# Patient Record
Sex: FEMALE | Race: WHITE | NOT HISPANIC OR LATINO | RURAL
[De-identification: names, ages, dates, MRNs, and addresses within clinical notes are randomized per-mention and may not be internally consistent; named-entity substitution may affect disease eponyms.]

---

## 2022-01-01 ENCOUNTER — APPOINTMENT (OUTPATIENT)
Dept: RADIOLOGY | Facility: HOSPITAL | Age: 82
End: 2022-01-01
Payer: MEDICARE

## 2022-01-01 ENCOUNTER — HOSPITAL ENCOUNTER (INPATIENT)
Facility: HOSPITAL | Age: 82
LOS: 6 days | DRG: 193 | End: 2022-12-15
Attending: FAMILY MEDICINE | Admitting: FAMILY MEDICINE
Payer: MEDICARE

## 2022-01-01 ENCOUNTER — HOSPITAL ENCOUNTER (OUTPATIENT)
Dept: RADIOLOGY | Facility: HOSPITAL | Age: 82
Discharge: HOME | End: 2022-12-14
Payer: MEDICARE

## 2022-01-01 VITALS — OXYGEN SATURATION: 97 % | HEART RATE: 59 BPM

## 2022-01-01 VITALS
BODY MASS INDEX: 16.26 KG/M2 | OXYGEN SATURATION: 86 % | HEART RATE: 35 BPM | HEIGHT: 61 IN | WEIGHT: 86.13 LBS | RESPIRATION RATE: 12 BRPM | TEMPERATURE: 97 F | SYSTOLIC BLOOD PRESSURE: 49 MMHG | DIASTOLIC BLOOD PRESSURE: 20 MMHG

## 2022-01-01 DIAGNOSIS — R63.8 UNABLE TO EAT: ICD-10-CM

## 2022-01-01 DIAGNOSIS — D64.9 CHRONIC ANEMIA: ICD-10-CM

## 2022-01-01 DIAGNOSIS — J18.9 PNEUMONIA: ICD-10-CM

## 2022-01-01 DIAGNOSIS — J18.9 PNEUMONIA INVOLVING LEFT LUNG: Primary | ICD-10-CM

## 2022-01-01 DIAGNOSIS — E43 SEVERE MALNUTRITION: ICD-10-CM

## 2022-01-01 DIAGNOSIS — R07.9 CHEST PAIN: ICD-10-CM

## 2022-01-01 DIAGNOSIS — J44.9 COPD (CHRONIC OBSTRUCTIVE PULMONARY DISEASE): ICD-10-CM

## 2022-01-01 DIAGNOSIS — J18.9 PNEUMONIA OF LEFT LOWER LOBE DUE TO INFECTIOUS ORGANISM: ICD-10-CM

## 2022-01-01 DIAGNOSIS — J44.1 COPD EXACERBATION: ICD-10-CM

## 2022-01-01 LAB
25(OH)D3 SERPL-MCNC: 12.5 NG/ML
ABO + RH BLD: NORMAL
ABORH RETYPE: NORMAL
ACANTHOCYTES BLD QL SMEAR: ABNORMAL
ALBUMIN FLD-MCNC: 0.7 G/DL
ALBUMIN SERPL BCP-MCNC: 1.8 G/DL (ref 3.5–5)
ALBUMIN SERPL BCP-MCNC: 1.8 G/DL (ref 3.5–5)
ALBUMIN SERPL BCP-MCNC: 2.9 G/DL (ref 3.5–5)
ALBUMIN/GLOB SERPL: 0.7 {RATIO}
ALBUMIN/GLOB SERPL: 0.7 {RATIO}
ALBUMIN/GLOB SERPL: 1.3 {RATIO}
ALP SERPL-CCNC: 54 U/L (ref 55–142)
ALP SERPL-CCNC: 71 U/L (ref 55–142)
ALP SERPL-CCNC: 74 U/L (ref 55–142)
ALT SERPL W P-5'-P-CCNC: 14 U/L (ref 13–56)
ALT SERPL W P-5'-P-CCNC: 14 U/L (ref 13–56)
ALT SERPL W P-5'-P-CCNC: 15 U/L (ref 13–56)
AMMONIA PLAS-SCNC: 31 ΜMOL/L (ref 11–32)
ANION GAP SERPL CALCULATED.3IONS-SCNC: -28 MMOL/L (ref 7–16)
ANION GAP SERPL CALCULATED.3IONS-SCNC: 14 MMOL/L (ref 7–16)
ANION GAP SERPL CALCULATED.3IONS-SCNC: 16 MMOL/L (ref 7–16)
ANION GAP SERPL CALCULATED.3IONS-SCNC: 3 MMOL/L (ref 7–16)
ANION GAP SERPL CALCULATED.3IONS-SCNC: 4 MMOL/L (ref 7–16)
ANION GAP SERPL CALCULATED.3IONS-SCNC: 7 MMOL/L (ref 7–16)
ANION GAP SERPL CALCULATED.3IONS-SCNC: 8 MMOL/L (ref 7–16)
ANISOCYTOSIS BLD QL SMEAR: ABNORMAL
AORTIC ROOT ANNULUS: 2.5 CM
AORTIC VALVE CUSP SEPERATION: 1.88 CM
APTT PPP: 37.1 SECONDS (ref 25.2–37.3)
AST SERPL W P-5'-P-CCNC: 20 U/L (ref 15–37)
AST SERPL W P-5'-P-CCNC: 22 U/L (ref 15–37)
AST SERPL W P-5'-P-CCNC: 27 U/L (ref 15–37)
AV INDEX (PROSTH): 0.29
AV MEAN GRADIENT: 10 MMHG
AV PEAK GRADIENT: 27 MMHG
AV REGURGITATION PRESSURE HALF TIME: 1051 MS
AV VALVE AREA: 0.66 CM2
AV VELOCITY RATIO: 0.31
BACTERIA SPEC BFLD CULT: NORMAL
BASOPHILS # BLD AUTO: 0 K/UL (ref 0–0.2)
BASOPHILS # BLD AUTO: 0.01 K/UL (ref 0–0.2)
BASOPHILS # BLD AUTO: 0.01 K/UL (ref 0–0.2)
BASOPHILS # BLD AUTO: 0.02 K/UL (ref 0–0.2)
BASOPHILS # BLD AUTO: 0.02 K/UL (ref 0–0.2)
BASOPHILS # BLD AUTO: 0.04 K/UL (ref 0–0.2)
BASOPHILS NFR BLD AUTO: 0 % (ref 0–1)
BASOPHILS NFR BLD AUTO: 0.1 % (ref 0–1)
BASOPHILS NFR BLD AUTO: 0.3 % (ref 0–1)
BILIRUB SERPL-MCNC: 0.4 MG/DL (ref ?–1.2)
BILIRUB SERPL-MCNC: 0.5 MG/DL (ref ?–1.2)
BILIRUB SERPL-MCNC: 0.7 MG/DL (ref ?–1.2)
BILIRUB UR QL STRIP: NEGATIVE
BLD PROD TYP BPU: NORMAL
BLOOD UNIT EXPIRATION DATE: NORMAL
BLOOD UNIT TYPE CODE: 5100
BUN SERPL-MCNC: 16 MG/DL (ref 7–18)
BUN SERPL-MCNC: 28 MG/DL (ref 7–18)
BUN SERPL-MCNC: 29 MG/DL (ref 7–18)
BUN SERPL-MCNC: 34 MG/DL (ref 7–18)
BUN SERPL-MCNC: 36 MG/DL (ref 7–18)
BUN SERPL-MCNC: 36 MG/DL (ref 7–18)
BUN SERPL-MCNC: 45 MG/DL (ref 7–18)
BUN/CREAT SERPL: 24 (ref 6–20)
BUN/CREAT SERPL: 28 (ref 6–20)
BUN/CREAT SERPL: 30 (ref 6–20)
BUN/CREAT SERPL: 30 (ref 6–20)
BUN/CREAT SERPL: 33 (ref 6–20)
BUN/CREAT SERPL: 33 (ref 6–20)
BUN/CREAT SERPL: 35 (ref 6–20)
CALCIUM SERPL-MCNC: 7.4 MG/DL (ref 8.5–10.1)
CALCIUM SERPL-MCNC: 7.6 MG/DL (ref 8.5–10.1)
CALCIUM SERPL-MCNC: 7.6 MG/DL (ref 8.5–10.1)
CALCIUM SERPL-MCNC: 7.9 MG/DL (ref 8.5–10.1)
CHLORIDE SERPL-SCNC: 106 MMOL/L (ref 98–107)
CHLORIDE SERPL-SCNC: 107 MMOL/L (ref 98–107)
CHLORIDE SERPL-SCNC: 109 MMOL/L (ref 98–107)
CHLORIDE SERPL-SCNC: 109 MMOL/L (ref 98–107)
CHLORIDE SERPL-SCNC: 110 MMOL/L (ref 98–107)
CLARITY FLD: CLEAR
CLARITY UR: CLEAR
CO2 SERPL-SCNC: 21 MMOL/L (ref 21–32)
CO2 SERPL-SCNC: 21 MMOL/L (ref 21–32)
CO2 SERPL-SCNC: 22 MMOL/L (ref 21–32)
CO2 SERPL-SCNC: 23 MMOL/L (ref 21–32)
CO2 SERPL-SCNC: 26 MMOL/L (ref 21–32)
CO2 SERPL-SCNC: 29 MMOL/L (ref 21–32)
CO2 SERPL-SCNC: 33 MMOL/L (ref 21–32)
COLOR FLD: ABNORMAL
COLOR UR: NORMAL
CREAT SERPL-MCNC: 0.66 MG/DL (ref 0.55–1.02)
CREAT SERPL-MCNC: 0.88 MG/DL (ref 0.55–1.02)
CREAT SERPL-MCNC: 0.93 MG/DL (ref 0.55–1.02)
CREAT SERPL-MCNC: 1.04 MG/DL (ref 0.55–1.02)
CREAT SERPL-MCNC: 1.08 MG/DL (ref 0.55–1.02)
CREAT SERPL-MCNC: 1.12 MG/DL (ref 0.55–1.02)
CREAT SERPL-MCNC: 1.59 MG/DL (ref 0.55–1.02)
CRENATED CELLS: ABNORMAL
CRENATED CELLS: ABNORMAL
CROSSMATCH INTERPRETATION: NORMAL
CV ECHO LV RWT: 0.77 CM
D DIMER PPP FEU-MCNC: 2.43 ΜG/ML (ref 0–0.47)
DIFFERENTIAL METHOD BLD: ABNORMAL
DISPENSE STATUS: NORMAL
DOP CALC AO PEAK VEL: 2.6 M/S
DOP CALC AO VTI: 65 CM
DOP CALC LVOT AREA: 2.3 CM2
DOP CALC LVOT DIAMETER: 1.7 CM
DOP CALC LVOT PEAK VEL: 0.8 M/S
DOP CALC LVOT STROKE VOLUME: 43.1 CM3
DOP CALC MV VTI: 27.2 CM
DOP CALCLVOT PEAK VEL VTI: 19 CM
E WAVE DECELERATION TIME: 171 MSEC
ECHO EF ESTIMATED: 55 %
ECHO LV POSTERIOR WALL: 1.58 CM (ref 0.6–1.1)
EGFR (NO RACE VARIABLE) (RUSH/TITUS): 33 ML/MIN/1.73M²
EGFR (NO RACE VARIABLE) (RUSH/TITUS): 50 ML/MIN/1.73M²
EGFR (NO RACE VARIABLE) (RUSH/TITUS): 52 ML/MIN/1.73M²
EGFR (NO RACE VARIABLE) (RUSH/TITUS): 54 ML/MIN/1.73M²
EGFR (NO RACE VARIABLE) (RUSH/TITUS): 62 ML/MIN/1.73M²
EGFR (NO RACE VARIABLE) (RUSH/TITUS): 66 ML/MIN/1.73M²
EGFR (NO RACE VARIABLE) (RUSH/TITUS): 88 ML/MIN/1.73M²
EJECTION FRACTION: 55 %
EOSINOPHIL # BLD AUTO: 0 K/UL (ref 0–0.5)
EOSINOPHIL NFR BLD AUTO: 0 % (ref 1–4)
ERYTHROCYTE [DISTWIDTH] IN BLOOD BY AUTOMATED COUNT: 16 % (ref 11.5–14.5)
ERYTHROCYTE [DISTWIDTH] IN BLOOD BY AUTOMATED COUNT: 16.4 % (ref 11.5–14.5)
ERYTHROCYTE [DISTWIDTH] IN BLOOD BY AUTOMATED COUNT: 16.6 % (ref 11.5–14.5)
ERYTHROCYTE [DISTWIDTH] IN BLOOD BY AUTOMATED COUNT: 16.7 % (ref 11.5–14.5)
ERYTHROCYTE [DISTWIDTH] IN BLOOD BY AUTOMATED COUNT: 17.1 % (ref 11.5–14.5)
ERYTHROCYTE [DISTWIDTH] IN BLOOD BY AUTOMATED COUNT: 18 % (ref 11.5–14.5)
FERRITIN SERPL-MCNC: 724 NG/ML (ref 8–252)
FERRITIN SERPL-MCNC: 975 NG/ML (ref 8–252)
FLUAV AG UPPER RESP QL IA.RAPID: NEGATIVE
FLUBV AG UPPER RESP QL IA.RAPID: NEGATIVE
FRACTIONAL SHORTENING: 15 % (ref 28–44)
GLOBULIN SER-MCNC: 2.3 G/DL (ref 2–4)
GLOBULIN SER-MCNC: 2.7 G/DL (ref 2–4)
GLOBULIN SER-MCNC: 2.7 G/DL (ref 2–4)
GLUCOSE FLD-MCNC: 124 MG/DL
GLUCOSE SERPL-MCNC: 102 MG/DL (ref 74–106)
GLUCOSE SERPL-MCNC: 109 MG/DL (ref 74–106)
GLUCOSE SERPL-MCNC: 131 MG/DL (ref 74–106)
GLUCOSE SERPL-MCNC: 73 MG/DL (ref 74–106)
GLUCOSE SERPL-MCNC: 81 MG/DL (ref 74–106)
GLUCOSE SERPL-MCNC: 96 MG/DL (ref 74–106)
GLUCOSE SERPL-MCNC: 98 MG/DL (ref 74–106)
GLUCOSE UR STRIP-MCNC: NORMAL MG/DL
GRANULOCYTES NFR FLD MANUAL: 70 % (ref 0–25)
HCO3 UR-SCNC: 20.2 MMOL/L (ref 21–28)
HCO3 UR-SCNC: ABNORMAL MMOL/L (ref 21–28)
HCT VFR BLD AUTO: 21.8 % (ref 38–47)
HCT VFR BLD AUTO: 26.5 % (ref 38–47)
HCT VFR BLD AUTO: 27.1 % (ref 38–47)
HCT VFR BLD AUTO: 27.4 % (ref 38–47)
HCT VFR BLD AUTO: 28.6 % (ref 38–47)
HCT VFR BLD AUTO: 31.4 % (ref 38–47)
HGB BLD-MCNC: 10.2 G/DL (ref 12–16)
HGB BLD-MCNC: 6.9 G/DL (ref 12–16)
HGB BLD-MCNC: 8 G/DL (ref 12–16)
HGB BLD-MCNC: 8.3 G/DL (ref 12–16)
HGB BLD-MCNC: 8.5 G/DL (ref 12–16)
HGB BLD-MCNC: 9 G/DL (ref 12–16)
HYPOCHROMIA BLD QL SMEAR: ABNORMAL
IMM GRANULOCYTES # BLD AUTO: 0.05 K/UL (ref 0–0.04)
IMM GRANULOCYTES # BLD AUTO: 0.06 K/UL (ref 0–0.04)
IMM GRANULOCYTES # BLD AUTO: 0.11 K/UL (ref 0–0.04)
IMM GRANULOCYTES # BLD AUTO: 0.11 K/UL (ref 0–0.04)
IMM GRANULOCYTES # BLD AUTO: 0.16 K/UL (ref 0–0.04)
IMM GRANULOCYTES # BLD AUTO: 0.19 K/UL (ref 0–0.04)
IMM GRANULOCYTES NFR BLD: 0.5 % (ref 0–0.4)
IMM GRANULOCYTES NFR BLD: 0.8 % (ref 0–0.4)
IMM GRANULOCYTES NFR BLD: 0.9 % (ref 0–0.4)
IMM GRANULOCYTES NFR BLD: 1 % (ref 0–0.4)
IMM GRANULOCYTES NFR BLD: 1.3 % (ref 0–0.4)
IMM GRANULOCYTES NFR BLD: 1.3 % (ref 0–0.4)
INDIRECT COOMBS: NORMAL
INR BLD: 1.37
INSULIN SERPL-ACNC: NORMAL U[IU]/ML
INTERVENTRICULAR SEPTUM: 1.7 CM (ref 0.6–1.1)
IRON SATN MFR SERPL: 32 % (ref 14–50)
IRON SATN MFR SERPL: 81 % (ref 14–50)
IRON SERPL-MCNC: 37 ΜG/DL (ref 50–170)
IRON SERPL-MCNC: 81 ΜG/DL (ref 50–170)
IVC OSTIUM: 1.1 CM
KETONES UR STRIP-SCNC: NEGATIVE MG/DL
LAB AP GROSS DESCRIPTION: NORMAL
LAB AP LABORATORY NOTES: NORMAL
LAB AP SPECIMEN A NON-GYN GENERAL CATEGORIZATION: NEGATIVE
LAB AP SPECIMEN A NON-GYN INTERPETATION: NORMAL
LDH FLD-CCNC: 75 U/L
LDH SERPL-CCNC: 232 U/L (ref 87–241)
LEFT ATRIUM SIZE: 3.7 CM
LEFT INTERNAL DIMENSION IN SYSTOLE: 3.5 CM (ref 2.1–4)
LEFT VENTRICLE DIASTOLIC VOLUME INDEX: 56.21 ML/M2
LEFT VENTRICLE DIASTOLIC VOLUME: 74.2 ML
LEFT VENTRICLE MASS INDEX: 210 G/M2
LEFT VENTRICLE SYSTOLIC VOLUME INDEX: 38.6 ML/M2
LEFT VENTRICLE SYSTOLIC VOLUME: 50.9 ML
LEFT VENTRICULAR INTERNAL DIMENSION IN DIASTOLE: 4.1 CM (ref 3.5–6)
LEFT VENTRICULAR MASS: 277.68 G
LEUKOCYTE ESTERASE UR QL STRIP: NEGATIVE
LVOT MG: 2 MMHG
LYMPHOCYTES # BLD AUTO: 0.35 K/UL (ref 1–4.8)
LYMPHOCYTES # BLD AUTO: 0.45 K/UL (ref 1–4.8)
LYMPHOCYTES # BLD AUTO: 0.5 K/UL (ref 1–4.8)
LYMPHOCYTES # BLD AUTO: 0.61 K/UL (ref 1–4.8)
LYMPHOCYTES # BLD AUTO: 0.69 K/UL (ref 1–4.8)
LYMPHOCYTES # BLD AUTO: 0.84 K/UL (ref 1–4.8)
LYMPHOCYTES NFR BLD AUTO: 3.3 % (ref 27–41)
LYMPHOCYTES NFR BLD AUTO: 4.6 % (ref 27–41)
LYMPHOCYTES NFR BLD AUTO: 4.7 % (ref 27–41)
LYMPHOCYTES NFR BLD AUTO: 5.4 % (ref 27–41)
LYMPHOCYTES NFR BLD AUTO: 6 % (ref 27–41)
LYMPHOCYTES NFR BLD AUTO: 6.1 % (ref 27–41)
LYMPHOCYTES NFR BLD MANUAL: 3 % (ref 27–41)
LYMPHOCYTES NFR BLD MANUAL: 4 % (ref 27–41)
LYMPHOCYTES NFR BLD MANUAL: 5 % (ref 27–41)
LYMPHOCYTES NFR BLD MANUAL: 9 % (ref 27–41)
LYMPHOCYTES NFR FLD MANUAL: 4 %
MACROCYTES BLD QL SMEAR: ABNORMAL
MACROPHAGES NFR FLD MANUAL: 12 %
MAGNESIUM SERPL-MCNC: 1.7 MG/DL (ref 1.7–2.3)
MAGNESIUM SERPL-MCNC: 1.8 MG/DL (ref 1.7–2.3)
MCH RBC QN AUTO: 30 PG (ref 27–31)
MCH RBC QN AUTO: 30.2 PG (ref 27–31)
MCH RBC QN AUTO: 30.3 PG (ref 27–31)
MCH RBC QN AUTO: 30.4 PG (ref 27–31)
MCH RBC QN AUTO: 30.5 PG (ref 27–31)
MCH RBC QN AUTO: 30.5 PG (ref 27–31)
MCHC RBC AUTO-ENTMCNC: 29.2 G/DL (ref 32–36)
MCHC RBC AUTO-ENTMCNC: 31.3 G/DL (ref 32–36)
MCHC RBC AUTO-ENTMCNC: 31.4 G/DL (ref 32–36)
MCHC RBC AUTO-ENTMCNC: 31.5 G/DL (ref 32–36)
MCHC RBC AUTO-ENTMCNC: 31.7 G/DL (ref 32–36)
MCHC RBC AUTO-ENTMCNC: 32.5 G/DL (ref 32–36)
MCV RBC AUTO: 104.6 FL (ref 80–96)
MCV RBC AUTO: 94 FL (ref 80–96)
MCV RBC AUTO: 95.6 FL (ref 80–96)
MCV RBC AUTO: 95.8 FL (ref 80–96)
MCV RBC AUTO: 96.4 FL (ref 80–96)
MCV RBC AUTO: 96.6 FL (ref 80–96)
MONOCYTES # BLD AUTO: 0.09 K/UL (ref 0–0.8)
MONOCYTES # BLD AUTO: 0.12 K/UL (ref 0–0.8)
MONOCYTES # BLD AUTO: 0.22 K/UL (ref 0–0.8)
MONOCYTES # BLD AUTO: 0.23 K/UL (ref 0–0.8)
MONOCYTES # BLD AUTO: 0.25 K/UL (ref 0–0.8)
MONOCYTES # BLD AUTO: 0.26 K/UL (ref 0–0.8)
MONOCYTES NFR BLD AUTO: 1.1 % (ref 2–6)
MONOCYTES NFR BLD AUTO: 1.2 % (ref 2–6)
MONOCYTES NFR BLD AUTO: 1.5 % (ref 2–6)
MONOCYTES NFR BLD AUTO: 1.7 % (ref 2–6)
MONOCYTES NFR BLD AUTO: 1.9 % (ref 2–6)
MONOCYTES NFR BLD AUTO: 2.8 % (ref 2–6)
MONOCYTES NFR BLD MANUAL: 1 % (ref 2–6)
MONOCYTES NFR BLD MANUAL: 1 % (ref 2–6)
MONOCYTES NFR BLD MANUAL: 3 % (ref 2–6)
MONOCYTES NFR BLD MANUAL: 3 % (ref 2–6)
MONOCYTES NFR FLD MANUAL: 14 %
MPC BLD CALC-MCNC: 10.2 FL (ref 9.4–12.4)
MPC BLD CALC-MCNC: 10.3 FL (ref 9.4–12.4)
MPC BLD CALC-MCNC: 10.4 FL (ref 9.4–12.4)
MPC BLD CALC-MCNC: 9.3 FL (ref 9.4–12.4)
MPC BLD CALC-MCNC: 9.8 FL (ref 9.4–12.4)
MPC BLD CALC-MCNC: 9.8 FL (ref 9.4–12.4)
MV MEAN GRADIENT: 1 MMHG
MV PEAK E VEL: 0.67 M/S
MV PEAK GRADIENT: 4 MMHG
MV STENOSIS PRESSURE HALF TIME: 89 MS
MV VALVE AREA BY CONTINUITY EQUATION: 1.58 CM2
MV VALVE AREA P 1/2 METHOD: 2.47 CM2
NEUTROPHILS # BLD AUTO: 10.15 K/UL (ref 1.8–7.7)
NEUTROPHILS # BLD AUTO: 11.92 K/UL (ref 1.8–7.7)
NEUTROPHILS # BLD AUTO: 13.79 K/UL (ref 1.8–7.7)
NEUTROPHILS # BLD AUTO: 14.39 K/UL (ref 1.8–7.7)
NEUTROPHILS # BLD AUTO: 6.85 K/UL (ref 1.8–7.7)
NEUTROPHILS # BLD AUTO: 7.37 K/UL (ref 1.8–7.7)
NEUTROPHILS NFR BLD AUTO: 89.7 % (ref 53–65)
NEUTROPHILS NFR BLD AUTO: 91.8 % (ref 53–65)
NEUTROPHILS NFR BLD AUTO: 92 % (ref 53–65)
NEUTROPHILS NFR BLD AUTO: 92.2 % (ref 53–65)
NEUTROPHILS NFR BLD AUTO: 92.5 % (ref 53–65)
NEUTROPHILS NFR BLD AUTO: 95 % (ref 53–65)
NEUTS BAND NFR BLD MANUAL: 13 % (ref 1–5)
NEUTS BAND NFR BLD MANUAL: 5 % (ref 1–5)
NEUTS BAND NFR BLD MANUAL: 5 % (ref 1–5)
NEUTS BAND NFR BLD MANUAL: 9 % (ref 1–5)
NEUTS SEG NFR BLD MANUAL: 79 % (ref 50–62)
NEUTS SEG NFR BLD MANUAL: 85 % (ref 50–62)
NEUTS SEG NFR BLD MANUAL: 89 % (ref 50–62)
NEUTS SEG NFR BLD MANUAL: 90 % (ref 50–62)
NEUTS SEG NFR BLD MANUAL: 91 % (ref 50–62)
NEUTS SEG NFR BLD MANUAL: 95 % (ref 50–62)
NITRITE UR QL STRIP: NEGATIVE
NRBC # BLD AUTO: 0 X10E3/UL
NRBC # BLD AUTO: 0.03 X10E3/UL
NRBC, AUTO (.00): 0 %
NRBC, AUTO (.00): 0.4 %
OVALOCYTES BLD QL SMEAR: ABNORMAL
PCO2 BLDA: 29 MMHG (ref 35–48)
PCO2 BLDA: >115 MMHG (ref 35–48)
PH FLD STRIP: 8 PH UNITS (ref 6.8–7.6)
PH SMN: 6.98 [PH] (ref 7.35–7.45)
PH SMN: 7.45 [PH] (ref 7.35–7.45)
PH UR STRIP: 5.5 PH UNITS
PHOSPHATE SERPL-MCNC: 4.3 MG/DL (ref 2.5–4.5)
PISA AR MAX VEL: 3.54 M/S
PISA TR MAX VEL: 2.7 M/S
PLATELET # BLD AUTO: 113 K/UL (ref 150–400)
PLATELET # BLD AUTO: 122 K/UL (ref 150–400)
PLATELET # BLD AUTO: 143 K/UL (ref 150–400)
PLATELET # BLD AUTO: 150 K/UL (ref 150–400)
PLATELET # BLD AUTO: 165 K/UL (ref 150–400)
PLATELET # BLD AUTO: 184 K/UL (ref 150–400)
PLATELET MORPHOLOGY: ABNORMAL
PLATELET MORPHOLOGY: NORMAL
PO2 BLDA: 70 MMHG (ref 83–108)
PO2 BLDA: 81 MMHG (ref 83–108)
POC BASE EXCESS: -2.8 MMOL/L (ref -2–3)
POC BASE EXCESS: ABNORMAL MMOL/L (ref -2–3)
POC SATURATED O2: 95 %
POC SATURATED O2: ABNORMAL %
POTASSIUM SERPL-SCNC: 2.1 MMOL/L (ref 3.5–5.1)
POTASSIUM SERPL-SCNC: 2.2 MMOL/L (ref 3.5–5.1)
POTASSIUM SERPL-SCNC: 2.4 MMOL/L (ref 3.5–5.1)
POTASSIUM SERPL-SCNC: 3 MMOL/L (ref 3.5–5.1)
POTASSIUM SERPL-SCNC: 3.2 MMOL/L (ref 3.5–5.1)
POTASSIUM SERPL-SCNC: 3.5 MMOL/L (ref 3.5–5.1)
POTASSIUM SERPL-SCNC: 3.5 MMOL/L (ref 3.5–5.1)
PREALB SERPL NEPH-MCNC: 12 MG/DL (ref 20–40)
PROT FLD-MCNC: 1.3 G/DL
PROT SERPL-MCNC: 4.5 G/DL (ref 6.4–8.2)
PROT SERPL-MCNC: 4.5 G/DL (ref 6.4–8.2)
PROT SERPL-MCNC: 5.2 G/DL (ref 6.4–8.2)
PROT UR QL STRIP: NEGATIVE
PROTHROMBIN TIME: 16.3 SECONDS (ref 11.7–14.7)
RA MAJOR: 2.9 CM
RA PRESSURE: 3 MMHG
RBC # BLD AUTO: 2.28 M/UL (ref 4.2–5.4)
RBC # BLD AUTO: 2.62 M/UL (ref 4.2–5.4)
RBC # BLD AUTO: 2.75 M/UL (ref 4.2–5.4)
RBC # BLD AUTO: 2.83 M/UL (ref 4.2–5.4)
RBC # BLD AUTO: 2.96 M/UL (ref 4.2–5.4)
RBC # BLD AUTO: 3.34 M/UL (ref 4.2–5.4)
RBC # FLD MANUAL: <3000 /CUMM
RBC # UR STRIP: NEGATIVE /UL
RH BLD: NORMAL
RIGHT VENTRICULAR END-DIASTOLIC DIMENSION: 3.3 CM
SARS-COV+SARS-COV-2 AG RESP QL IA.RAPID: NEGATIVE
SODIUM SERPL-SCNC: 108 MMOL/L (ref 136–145)
SODIUM SERPL-SCNC: 133 MMOL/L (ref 136–145)
SODIUM SERPL-SCNC: 134 MMOL/L (ref 136–145)
SODIUM SERPL-SCNC: 137 MMOL/L (ref 136–145)
SODIUM SERPL-SCNC: 138 MMOL/L (ref 136–145)
SODIUM SERPL-SCNC: 140 MMOL/L (ref 136–145)
SODIUM SERPL-SCNC: 142 MMOL/L (ref 136–145)
SP GR UR STRIP: 1.02
TIBC SERPL-MCNC: 100 ΜG/DL (ref 250–450)
TIBC SERPL-MCNC: 117 ΜG/DL (ref 250–450)
TR MAX PG: 29 MMHG
TRICUSPID ANNULAR PLANE SYSTOLIC EXCURSION: 1.8 CM
TSH SERPL DL<=0.005 MIU/L-ACNC: 2.72 UIU/ML (ref 0.36–3.74)
TV REST PULMONARY ARTERY PRESSURE: 32 MMHG
UNIT NUMBER: NORMAL
UROBILINOGEN UR STRIP-ACNC: NORMAL MG/DL
WBC # BLD AUTO: 10.68 K/UL (ref 4.5–11)
WBC # BLD AUTO: 12.93 K/UL (ref 4.5–11)
WBC # BLD AUTO: 14.91 K/UL (ref 4.5–11)
WBC # BLD AUTO: 15.67 K/UL (ref 4.5–11)
WBC # BLD AUTO: 7.45 K/UL (ref 4.5–11)
WBC # BLD AUTO: 8.22 K/UL (ref 4.5–11)
WBC # FLD MANUAL: 184 /CUMM

## 2022-01-01 PROCEDURE — 84157 ASSAY OF PROTEIN OTHER: CPT

## 2022-01-01 PROCEDURE — 36430 TRANSFUSION BLD/BLD COMPNT: CPT

## 2022-01-01 PROCEDURE — 85730 THROMBOPLASTIN TIME PARTIAL: CPT | Performed by: RADIOLOGY

## 2022-01-01 PROCEDURE — 99900035 HC TECH TIME PER 15 MIN (STAT)

## 2022-01-01 PROCEDURE — C9113 INJ PANTOPRAZOLE SODIUM, VIA: HCPCS | Performed by: FAMILY MEDICINE

## 2022-01-01 PROCEDURE — 11000001 HC ACUTE MED/SURG PRIVATE ROOM

## 2022-01-01 PROCEDURE — 36415 COLL VENOUS BLD VENIPUNCTURE: CPT

## 2022-01-01 PROCEDURE — 63600175 PHARM REV CODE 636 W HCPCS: Performed by: FAMILY MEDICINE

## 2022-01-01 PROCEDURE — 36600 WITHDRAWAL OF ARTERIAL BLOOD: CPT

## 2022-01-01 PROCEDURE — 63600175 PHARM REV CODE 636 W HCPCS

## 2022-01-01 PROCEDURE — 97110 THERAPEUTIC EXERCISES: CPT

## 2022-01-01 PROCEDURE — 25500020 PHARM REV CODE 255: Performed by: FAMILY MEDICINE

## 2022-01-01 PROCEDURE — 36415 COLL VENOUS BLD VENIPUNCTURE: CPT | Performed by: RADIOLOGY

## 2022-01-01 PROCEDURE — 71046 X-RAY EXAM CHEST 2 VIEWS: CPT

## 2022-01-01 PROCEDURE — 25000003 PHARM REV CODE 250

## 2022-01-01 PROCEDURE — 99223 1ST HOSP IP/OBS HIGH 75: CPT | Mod: ,,, | Performed by: INTERNAL MEDICINE

## 2022-01-01 PROCEDURE — 99232 PR SUBSEQUENT HOSPITAL CARE,LEVL II: ICD-10-PCS | Mod: ,,, | Performed by: INTERNAL MEDICINE

## 2022-01-01 PROCEDURE — 94761 N-INVAS EAR/PLS OXIMETRY MLT: CPT

## 2022-01-01 PROCEDURE — 88305 TISSUE EXAM BY PATHOLOGIST: CPT | Mod: 26,,, | Performed by: PATHOLOGY

## 2022-01-01 PROCEDURE — 83986 ASSAY PH BODY FLUID NOS: CPT | Performed by: FAMILY MEDICINE

## 2022-01-01 PROCEDURE — 99231 SBSQ HOSP IP/OBS SF/LOW 25: CPT | Mod: ,,, | Performed by: INTERNAL MEDICINE

## 2022-01-01 PROCEDURE — 71045 X-RAY EXAM CHEST 1 VIEW: CPT

## 2022-01-01 PROCEDURE — 81003 URINALYSIS AUTO W/O SCOPE: CPT

## 2022-01-01 PROCEDURE — 27000221 HC OXYGEN, UP TO 24 HOURS

## 2022-01-01 PROCEDURE — 25000242 PHARM REV CODE 250 ALT 637 W/ HCPCS: Performed by: INTERNAL MEDICINE

## 2022-01-01 PROCEDURE — 93306 TTE W/DOPPLER COMPLETE: CPT

## 2022-01-01 PROCEDURE — 85025 COMPLETE CBC W/AUTO DIFF WBC: CPT

## 2022-01-01 PROCEDURE — 87040 BLOOD CULTURE FOR BACTERIA: CPT

## 2022-01-01 PROCEDURE — 83540 ASSAY OF IRON: CPT | Performed by: INTERNAL MEDICINE

## 2022-01-01 PROCEDURE — 27000190 HC CPAP FULL FACE MASK W/VALVE

## 2022-01-01 PROCEDURE — 80053 COMPREHEN METABOLIC PANEL: CPT | Performed by: FAMILY MEDICINE

## 2022-01-01 PROCEDURE — 99233 PR SUBSEQUENT HOSPITAL CARE,LEVL III: ICD-10-PCS | Mod: GC,,, | Performed by: FAMILY MEDICINE

## 2022-01-01 PROCEDURE — 25000003 PHARM REV CODE 250: Performed by: FAMILY MEDICINE

## 2022-01-01 PROCEDURE — 82728 ASSAY OF FERRITIN: CPT | Performed by: INTERNAL MEDICINE

## 2022-01-01 PROCEDURE — 97162 PT EVAL MOD COMPLEX 30 MIN: CPT

## 2022-01-01 PROCEDURE — C1751 CATH, INF, PER/CENT/MIDLINE: HCPCS

## 2022-01-01 PROCEDURE — 88108 CYTOPATH CONCENTRATE TECH: CPT | Mod: 26,,, | Performed by: PATHOLOGY

## 2022-01-01 PROCEDURE — 88108 CYTOPATH CONCENTRATE TECH: CPT | Mod: TC,MCY | Performed by: FAMILY MEDICINE

## 2022-01-01 PROCEDURE — 82306 VITAMIN D 25 HYDROXY: CPT

## 2022-01-01 PROCEDURE — P9016 RBC LEUKOCYTES REDUCED: HCPCS

## 2022-01-01 PROCEDURE — 25000242 PHARM REV CODE 250 ALT 637 W/ HCPCS: Performed by: FAMILY MEDICINE

## 2022-01-01 PROCEDURE — 80053 COMPREHEN METABOLIC PANEL: CPT

## 2022-01-01 PROCEDURE — 84100 ASSAY OF PHOSPHORUS: CPT

## 2022-01-01 PROCEDURE — 74240 X-RAY XM UPR GI TRC 1CNTRST: CPT | Mod: TC

## 2022-01-01 PROCEDURE — 80048 BASIC METABOLIC PNL TOTAL CA: CPT | Mod: XB

## 2022-01-01 PROCEDURE — 86923 COMPATIBILITY TEST ELECTRIC: CPT

## 2022-01-01 PROCEDURE — 99231 PR SUBSEQUENT HOSPITAL CARE,LEVL I: ICD-10-PCS | Mod: ,,, | Performed by: INTERNAL MEDICINE

## 2022-01-01 PROCEDURE — 27202032

## 2022-01-01 PROCEDURE — 94640 AIRWAY INHALATION TREATMENT: CPT

## 2022-01-01 PROCEDURE — 70450 CT HEAD/BRAIN W/O DYE: CPT

## 2022-01-01 PROCEDURE — 96372 THER/PROPH/DIAG INJ SC/IM: CPT

## 2022-01-01 PROCEDURE — 99223 PR INITIAL HOSPITAL CARE,LEVL III: ICD-10-PCS | Mod: ,,, | Performed by: INTERNAL MEDICINE

## 2022-01-01 PROCEDURE — 84134 ASSAY OF PREALBUMIN: CPT | Performed by: INTERNAL MEDICINE

## 2022-01-01 PROCEDURE — 99232 SBSQ HOSP IP/OBS MODERATE 35: CPT | Mod: GC,,, | Performed by: FAMILY MEDICINE

## 2022-01-01 PROCEDURE — 80048 BASIC METABOLIC PNL TOTAL CA: CPT

## 2022-01-01 PROCEDURE — 11000008

## 2022-01-01 PROCEDURE — 83735 ASSAY OF MAGNESIUM: CPT | Performed by: FAMILY MEDICINE

## 2022-01-01 PROCEDURE — 74240 X-RAY XM UPR GI TRC 1CNTRST: CPT | Mod: 26,,, | Performed by: RADIOLOGY

## 2022-01-01 PROCEDURE — 25000003 PHARM REV CODE 250: Performed by: INTERNAL MEDICINE

## 2022-01-01 PROCEDURE — 71045 X-RAY EXAM CHEST 1 VIEW: CPT | Mod: TC

## 2022-01-01 PROCEDURE — 88305 NON-GYN CYTOLOGY: ICD-10-PCS | Mod: 26,,, | Performed by: PATHOLOGY

## 2022-01-01 PROCEDURE — 82803 BLOOD GASES ANY COMBINATION: CPT

## 2022-01-01 PROCEDURE — 88108 NON-GYN CYTOLOGY: ICD-10-PCS | Mod: 26,,, | Performed by: PATHOLOGY

## 2022-01-01 PROCEDURE — 36415 COLL VENOUS BLD VENIPUNCTURE: CPT | Performed by: FAMILY MEDICINE

## 2022-01-01 PROCEDURE — 74240 FL UPPER GI WITHOUT KUB: ICD-10-PCS | Mod: 26,,, | Performed by: RADIOLOGY

## 2022-01-01 PROCEDURE — 97530 THERAPEUTIC ACTIVITIES: CPT

## 2022-01-01 PROCEDURE — 32555 ASPIRATE PLEURA W/ IMAGING: CPT

## 2022-01-01 PROCEDURE — 99232 PR SUBSEQUENT HOSPITAL CARE,LEVL II: ICD-10-PCS | Mod: GC,,, | Performed by: FAMILY MEDICINE

## 2022-01-01 PROCEDURE — 99233 SBSQ HOSP IP/OBS HIGH 50: CPT | Mod: GC,,, | Performed by: FAMILY MEDICINE

## 2022-01-01 PROCEDURE — 85025 COMPLETE CBC W/AUTO DIFF WBC: CPT | Performed by: FAMILY MEDICINE

## 2022-01-01 PROCEDURE — 83615 LACTATE (LD) (LDH) ENZYME: CPT

## 2022-01-01 PROCEDURE — 99232 SBSQ HOSP IP/OBS MODERATE 35: CPT | Mod: ,,, | Performed by: INTERNAL MEDICINE

## 2022-01-01 PROCEDURE — 93970 EXTREMITY STUDY: CPT

## 2022-01-01 PROCEDURE — 97166 OT EVAL MOD COMPLEX 45 MIN: CPT

## 2022-01-01 PROCEDURE — 83540 ASSAY OF IRON: CPT

## 2022-01-01 PROCEDURE — 82042 OTHER SOURCE ALBUMIN QUAN EA: CPT | Performed by: FAMILY MEDICINE

## 2022-01-01 PROCEDURE — P9047 ALBUMIN (HUMAN), 25%, 50ML: HCPCS

## 2022-01-01 PROCEDURE — 87428 SARSCOV & INF VIR A&B AG IA: CPT

## 2022-01-01 PROCEDURE — 97530 THERAPEUTIC ACTIVITIES: CPT | Mod: CQ

## 2022-01-01 PROCEDURE — 89050 BODY FLUID CELL COUNT: CPT | Performed by: FAMILY MEDICINE

## 2022-01-01 PROCEDURE — P9047 ALBUMIN (HUMAN), 25%, 50ML: HCPCS | Performed by: FAMILY MEDICINE

## 2022-01-01 PROCEDURE — 85379 FIBRIN DEGRADATION QUANT: CPT

## 2022-01-01 PROCEDURE — 82140 ASSAY OF AMMONIA: CPT

## 2022-01-01 PROCEDURE — 27202788 HC INJECTOR CT MEDRAD

## 2022-01-01 PROCEDURE — 82728 ASSAY OF FERRITIN: CPT

## 2022-01-01 PROCEDURE — 71275 CT ANGIOGRAPHY CHEST: CPT

## 2022-01-01 PROCEDURE — 87653 STREP B DNA AMP PROBE: CPT | Performed by: FAMILY MEDICINE

## 2022-01-01 PROCEDURE — 89051 BODY FLUID CELL COUNT: CPT | Performed by: FAMILY MEDICINE

## 2022-01-01 PROCEDURE — 36573 INSJ PICC RS&I 5 YR+: CPT

## 2022-01-01 PROCEDURE — 94660 CPAP INITIATION&MGMT: CPT

## 2022-01-01 PROCEDURE — 92610 EVALUATE SWALLOWING FUNCTION: CPT

## 2022-01-01 PROCEDURE — 36415 COLL VENOUS BLD VENIPUNCTURE: CPT | Performed by: INTERNAL MEDICINE

## 2022-01-01 PROCEDURE — 83735 ASSAY OF MAGNESIUM: CPT

## 2022-01-01 PROCEDURE — 84443 ASSAY THYROID STIM HORMONE: CPT

## 2022-01-01 PROCEDURE — 86901 BLOOD TYPING SEROLOGIC RH(D): CPT

## 2022-01-01 PROCEDURE — 97110 THERAPEUTIC EXERCISES: CPT | Mod: CQ

## 2022-01-01 PROCEDURE — 27202788 CTA CHEST NON CORONARY (PE STUDIES)

## 2022-01-01 PROCEDURE — 76937 US GUIDE VASCULAR ACCESS: CPT

## 2022-01-01 RX ORDER — HYDRALAZINE HYDROCHLORIDE 20 MG/ML
10 INJECTION INTRAMUSCULAR; INTRAVENOUS EVERY 6 HOURS PRN
Status: DISCONTINUED | OUTPATIENT
Start: 2022-01-01 | End: 2022-01-01 | Stop reason: HOSPADM

## 2022-01-01 RX ORDER — FUROSEMIDE 10 MG/ML
40 INJECTION INTRAMUSCULAR; INTRAVENOUS EVERY 6 HOURS
Status: COMPLETED | OUTPATIENT
Start: 2022-01-01 | End: 2022-01-01

## 2022-01-01 RX ORDER — NAPROXEN SODIUM 220 MG/1
81 TABLET, FILM COATED ORAL DAILY
Status: DISCONTINUED | OUTPATIENT
Start: 2022-01-01 | End: 2022-01-01

## 2022-01-01 RX ORDER — HEPARIN SODIUM 5000 [USP'U]/ML
5000 INJECTION, SOLUTION INTRAVENOUS; SUBCUTANEOUS EVERY 8 HOURS
Status: DISCONTINUED | OUTPATIENT
Start: 2022-01-01 | End: 2022-01-01

## 2022-01-01 RX ORDER — MAGNESIUM SULFATE HEPTAHYDRATE 40 MG/ML
2 INJECTION, SOLUTION INTRAVENOUS ONCE
Status: COMPLETED | OUTPATIENT
Start: 2022-01-01 | End: 2022-01-01

## 2022-01-01 RX ORDER — SODIUM BICARBONATE 1 MEQ/ML
50 SYRINGE (ML) INTRAVENOUS ONCE
Status: COMPLETED | OUTPATIENT
Start: 2022-01-01 | End: 2022-01-01

## 2022-01-01 RX ORDER — FLUCONAZOLE 100 MG/1
400 TABLET ORAL ONCE
Status: DISCONTINUED | OUTPATIENT
Start: 2022-01-01 | End: 2022-01-01

## 2022-01-01 RX ORDER — HYDROCODONE BITARTRATE AND ACETAMINOPHEN 500; 5 MG/1; MG/1
TABLET ORAL
Status: DISCONTINUED | OUTPATIENT
Start: 2022-01-01 | End: 2022-01-01 | Stop reason: HOSPADM

## 2022-01-01 RX ORDER — LEVOFLOXACIN 5 MG/ML
750 INJECTION, SOLUTION INTRAVENOUS
Status: DISCONTINUED | OUTPATIENT
Start: 2022-01-01 | End: 2022-01-01

## 2022-01-01 RX ORDER — POTASSIUM CHLORIDE 7.45 MG/ML
40 INJECTION INTRAVENOUS ONCE
Status: DISCONTINUED | OUTPATIENT
Start: 2022-01-01 | End: 2022-01-01

## 2022-01-01 RX ORDER — LOSARTAN POTASSIUM 50 MG/1
50 TABLET ORAL DAILY
Status: DISCONTINUED | OUTPATIENT
Start: 2022-01-01 | End: 2022-01-01

## 2022-01-01 RX ORDER — IPRATROPIUM BROMIDE AND ALBUTEROL SULFATE 2.5; .5 MG/3ML; MG/3ML
3 SOLUTION RESPIRATORY (INHALATION) EVERY 6 HOURS
Status: DISCONTINUED | OUTPATIENT
Start: 2022-01-01 | End: 2022-01-01 | Stop reason: HOSPADM

## 2022-01-01 RX ORDER — FLUCONAZOLE 2 MG/ML
400 INJECTION, SOLUTION INTRAVENOUS ONCE
Status: COMPLETED | OUTPATIENT
Start: 2022-01-01 | End: 2022-01-01

## 2022-01-01 RX ORDER — LOSARTAN POTASSIUM 50 MG/1
50 TABLET ORAL EVERY 12 HOURS
Status: DISCONTINUED | OUTPATIENT
Start: 2022-01-01 | End: 2022-01-01

## 2022-01-01 RX ORDER — POTASSIUM CHLORIDE 14.9 MG/ML
20 INJECTION INTRAVENOUS
Status: COMPLETED | OUTPATIENT
Start: 2022-01-01 | End: 2022-01-01

## 2022-01-01 RX ORDER — CARVEDILOL 12.5 MG/1
12.5 TABLET ORAL 2 TIMES DAILY
Status: ON HOLD | COMMUNITY
Start: 2022-01-01 | End: 2022-01-01

## 2022-01-01 RX ORDER — ERGOCALCIFEROL 1.25 MG/1
50000 CAPSULE ORAL
Status: DISCONTINUED | OUTPATIENT
Start: 2022-01-01 | End: 2022-01-01 | Stop reason: HOSPADM

## 2022-01-01 RX ORDER — HEPARIN SODIUM 5000 [USP'U]/ML
5000 INJECTION, SOLUTION INTRAVENOUS; SUBCUTANEOUS EVERY 12 HOURS
Status: DISCONTINUED | OUTPATIENT
Start: 2022-01-01 | End: 2022-01-01

## 2022-01-01 RX ORDER — ALBUMIN HUMAN 250 G/1000ML
25 SOLUTION INTRAVENOUS EVERY 6 HOURS
Status: COMPLETED | OUTPATIENT
Start: 2022-01-01 | End: 2022-01-01

## 2022-01-01 RX ORDER — ALBUMIN HUMAN 250 G/1000ML
25 SOLUTION INTRAVENOUS EVERY 6 HOURS
Status: DISCONTINUED | OUTPATIENT
Start: 2022-01-01 | End: 2022-01-01

## 2022-01-01 RX ORDER — PANTOPRAZOLE SODIUM 40 MG/10ML
40 INJECTION, POWDER, LYOPHILIZED, FOR SOLUTION INTRAVENOUS DAILY
Status: DISCONTINUED | OUTPATIENT
Start: 2022-01-01 | End: 2022-01-01 | Stop reason: HOSPADM

## 2022-01-01 RX ORDER — ALBUMIN HUMAN 250 G/1000ML
25 SOLUTION INTRAVENOUS ONCE
Status: COMPLETED | OUTPATIENT
Start: 2022-01-01 | End: 2022-01-01

## 2022-01-01 RX ORDER — ACETAMINOPHEN 325 MG/1
650 TABLET ORAL EVERY 4 HOURS PRN
Status: DISCONTINUED | OUTPATIENT
Start: 2022-01-01 | End: 2022-01-01 | Stop reason: HOSPADM

## 2022-01-01 RX ORDER — IPRATROPIUM BROMIDE AND ALBUTEROL SULFATE 2.5; .5 MG/3ML; MG/3ML
3 SOLUTION RESPIRATORY (INHALATION) EVERY 4 HOURS PRN
Status: DISCONTINUED | OUTPATIENT
Start: 2022-01-01 | End: 2022-01-01

## 2022-01-01 RX ORDER — LACTOBACILLUS ACIDOPHILUS 500MM CELL
1 CAPSULE ORAL
Status: DISCONTINUED | OUTPATIENT
Start: 2022-01-01 | End: 2022-01-01 | Stop reason: HOSPADM

## 2022-01-01 RX ORDER — POTASSIUM CHLORIDE 7.45 MG/ML
30 INJECTION INTRAVENOUS ONCE
Status: DISCONTINUED | OUTPATIENT
Start: 2022-01-01 | End: 2022-01-01

## 2022-01-01 RX ORDER — LOSARTAN POTASSIUM 25 MG/1
25 TABLET ORAL DAILY
Status: ON HOLD | COMMUNITY
Start: 2022-01-01 | End: 2022-01-01

## 2022-01-01 RX ORDER — LOSARTAN POTASSIUM 25 MG/1
25 TABLET ORAL DAILY
Status: DISCONTINUED | OUTPATIENT
Start: 2022-01-01 | End: 2022-01-01

## 2022-01-01 RX ORDER — SODIUM CHLORIDE 9 MG/ML
INJECTION, SOLUTION INTRAVENOUS CONTINUOUS
Status: DISCONTINUED | OUTPATIENT
Start: 2022-01-01 | End: 2022-01-01

## 2022-01-01 RX ORDER — MUPIROCIN 20 MG/G
OINTMENT TOPICAL 2 TIMES DAILY
Status: DISCONTINUED | OUTPATIENT
Start: 2022-01-01 | End: 2022-01-01 | Stop reason: HOSPADM

## 2022-01-01 RX ORDER — SODIUM CHLORIDE 0.9 % (FLUSH) 0.9 %
10 SYRINGE (ML) INJECTION EVERY 12 HOURS PRN
Status: DISCONTINUED | OUTPATIENT
Start: 2022-01-01 | End: 2022-01-01 | Stop reason: HOSPADM

## 2022-01-01 RX ORDER — TALC
6 POWDER (GRAM) TOPICAL NIGHTLY PRN
Status: DISCONTINUED | OUTPATIENT
Start: 2022-01-01 | End: 2022-01-01 | Stop reason: HOSPADM

## 2022-01-01 RX ORDER — ROSUVASTATIN CALCIUM 5 MG/1
5 TABLET, COATED ORAL DAILY
Status: ON HOLD | COMMUNITY
End: 2022-01-01

## 2022-01-01 RX ORDER — BUDESONIDE 0.25 MG/2ML
0.25 INHALANT ORAL DAILY
Status: DISCONTINUED | OUTPATIENT
Start: 2022-01-01 | End: 2022-01-01 | Stop reason: HOSPADM

## 2022-01-01 RX ORDER — FLUCONAZOLE 100 MG/1
200 TABLET ORAL DAILY
Status: DISCONTINUED | OUTPATIENT
Start: 2022-01-01 | End: 2022-01-01

## 2022-01-01 RX ORDER — POLYETHYLENE GLYCOL 3350 17 G/17G
17 POWDER, FOR SOLUTION ORAL DAILY PRN
Status: DISCONTINUED | OUTPATIENT
Start: 2022-01-01 | End: 2022-01-01 | Stop reason: HOSPADM

## 2022-01-01 RX ORDER — FLUCONAZOLE 2 MG/ML
200 INJECTION, SOLUTION INTRAVENOUS
Status: DISCONTINUED | OUTPATIENT
Start: 2022-01-01 | End: 2022-01-01 | Stop reason: HOSPADM

## 2022-01-01 RX ORDER — FAMOTIDINE 40 MG/1
40 TABLET, FILM COATED ORAL DAILY
Status: ON HOLD | COMMUNITY
Start: 2022-01-01 | End: 2022-01-01

## 2022-01-01 RX ORDER — SODIUM BICARBONATE 1 MEQ/ML
SYRINGE (ML) INTRAVENOUS
Status: COMPLETED
Start: 2022-01-01 | End: 2022-01-01

## 2022-01-01 RX ORDER — CARVEDILOL 12.5 MG/1
12.5 TABLET ORAL 2 TIMES DAILY
Status: DISCONTINUED | OUTPATIENT
Start: 2022-01-01 | End: 2022-01-01 | Stop reason: HOSPADM

## 2022-01-01 RX ORDER — ONDANSETRON 2 MG/ML
4 INJECTION INTRAMUSCULAR; INTRAVENOUS EVERY 8 HOURS PRN
Status: DISCONTINUED | OUTPATIENT
Start: 2022-01-01 | End: 2022-01-01 | Stop reason: HOSPADM

## 2022-01-01 RX ORDER — ATORVASTATIN CALCIUM 20 MG/1
20 TABLET, FILM COATED ORAL DAILY
Status: DISCONTINUED | OUTPATIENT
Start: 2022-01-01 | End: 2022-01-01 | Stop reason: HOSPADM

## 2022-01-01 RX ORDER — FUROSEMIDE 10 MG/ML
40 INJECTION INTRAMUSCULAR; INTRAVENOUS ONCE
Status: COMPLETED | OUTPATIENT
Start: 2022-01-01 | End: 2022-01-01

## 2022-01-01 RX ORDER — POTASSIUM IODIDE 1 G/ML
5 SOLUTION ORAL 4 TIMES DAILY
Status: DISCONTINUED | OUTPATIENT
Start: 2022-01-01 | End: 2022-01-01 | Stop reason: HOSPADM

## 2022-01-01 RX ADMIN — FUROSEMIDE 40 MG: 10 INJECTION, SOLUTION INTRAMUSCULAR; INTRAVENOUS at 11:12

## 2022-01-01 RX ADMIN — METHYLPREDNISOLONE SODIUM SUCCINATE 20 MG: 40 INJECTION, POWDER, FOR SOLUTION INTRAMUSCULAR; INTRAVENOUS at 11:12

## 2022-01-01 RX ADMIN — METHYLPREDNISOLONE SODIUM SUCCINATE 40 MG: 40 INJECTION, POWDER, FOR SOLUTION INTRAMUSCULAR; INTRAVENOUS at 11:12

## 2022-01-01 RX ADMIN — HEPARIN SODIUM 5000 UNITS: 5000 INJECTION, SOLUTION INTRAVENOUS; SUBCUTANEOUS at 08:12

## 2022-01-01 RX ADMIN — PIPERACILLIN AND TAZOBACTAM 4.5 G: 4; .5 INJECTION, POWDER, LYOPHILIZED, FOR SOLUTION INTRAVENOUS; PARENTERAL at 02:12

## 2022-01-01 RX ADMIN — IPRATROPIUM BROMIDE AND ALBUTEROL SULFATE 3 ML: 2.5; .5 SOLUTION RESPIRATORY (INHALATION) at 12:12

## 2022-01-01 RX ADMIN — Medication 1 CAPSULE: at 11:12

## 2022-01-01 RX ADMIN — METHYLPREDNISOLONE SODIUM SUCCINATE 40 MG: 40 INJECTION, POWDER, FOR SOLUTION INTRAMUSCULAR; INTRAVENOUS at 12:12

## 2022-01-01 RX ADMIN — CARVEDILOL 12.5 MG: 12.5 TABLET, FILM COATED ORAL at 10:12

## 2022-01-01 RX ADMIN — HYDRALAZINE HYDROCHLORIDE 10 MG: 20 INJECTION INTRAMUSCULAR; INTRAVENOUS at 07:12

## 2022-01-01 RX ADMIN — POTASSIUM IODIDE 5 DROP: 1 SOLUTION ORAL at 05:12

## 2022-01-01 RX ADMIN — ATORVASTATIN CALCIUM 20 MG: 20 TABLET, FILM COATED ORAL at 09:12

## 2022-01-01 RX ADMIN — ALBUMIN (HUMAN) 25 G: 12.5 SOLUTION INTRAVENOUS at 12:12

## 2022-01-01 RX ADMIN — Medication 1 CAPSULE: at 09:12

## 2022-01-01 RX ADMIN — METHYLPREDNISOLONE SODIUM SUCCINATE 40 MG: 40 INJECTION, POWDER, FOR SOLUTION INTRAMUSCULAR; INTRAVENOUS at 10:12

## 2022-01-01 RX ADMIN — POTASSIUM CHLORIDE 20 MEQ: 14.9 INJECTION, SOLUTION INTRAVENOUS at 06:12

## 2022-01-01 RX ADMIN — POTASSIUM IODIDE 5 DROP: 1 SOLUTION ORAL at 09:12

## 2022-01-01 RX ADMIN — CARVEDILOL 12.5 MG: 12.5 TABLET, FILM COATED ORAL at 11:12

## 2022-01-01 RX ADMIN — IPRATROPIUM BROMIDE AND ALBUTEROL SULFATE 3 ML: 2.5; .5 SOLUTION RESPIRATORY (INHALATION) at 01:12

## 2022-01-01 RX ADMIN — HEPARIN SODIUM 5000 UNITS: 5000 INJECTION, SOLUTION INTRAVENOUS; SUBCUTANEOUS at 06:12

## 2022-01-01 RX ADMIN — MAGNESIUM SULFATE HEPTAHYDRATE 2 G: 40 INJECTION, SOLUTION INTRAVENOUS at 04:12

## 2022-01-01 RX ADMIN — PIPERACILLIN AND TAZOBACTAM 4.5 G: 4; .5 INJECTION, POWDER, LYOPHILIZED, FOR SOLUTION INTRAVENOUS; PARENTERAL at 04:12

## 2022-01-01 RX ADMIN — MUPIROCIN: 20 OINTMENT TOPICAL at 11:12

## 2022-01-01 RX ADMIN — POTASSIUM IODIDE 5 DROP: 1 SOLUTION ORAL at 11:12

## 2022-01-01 RX ADMIN — LEVOFLOXACIN 750 MG: 750 INJECTION, SOLUTION INTRAVENOUS at 10:12

## 2022-01-01 RX ADMIN — IOPAMIDOL 100 ML: 755 INJECTION, SOLUTION INTRAVENOUS at 12:12

## 2022-01-01 RX ADMIN — ALBUMIN (HUMAN) 25 G: 12.5 SOLUTION INTRAVENOUS at 05:12

## 2022-01-01 RX ADMIN — FUROSEMIDE 40 MG: 10 INJECTION, SOLUTION INTRAMUSCULAR; INTRAVENOUS at 05:12

## 2022-01-01 RX ADMIN — LEVOFLOXACIN 750 MG: 750 INJECTION, SOLUTION INTRAVENOUS at 11:12

## 2022-01-01 RX ADMIN — IPRATROPIUM BROMIDE AND ALBUTEROL SULFATE 3 ML: 2.5; .5 SOLUTION RESPIRATORY (INHALATION) at 02:12

## 2022-01-01 RX ADMIN — POTASSIUM CHLORIDE 20 MEQ: 14.9 INJECTION, SOLUTION INTRAVENOUS at 04:12

## 2022-01-01 RX ADMIN — ALBUMIN (HUMAN) 25 G: 12.5 SOLUTION INTRAVENOUS at 11:12

## 2022-01-01 RX ADMIN — SODIUM CHLORIDE: 9 INJECTION, SOLUTION INTRAVENOUS at 09:12

## 2022-01-01 RX ADMIN — LOSARTAN POTASSIUM 25 MG: 25 TABLET, FILM COATED ORAL at 09:12

## 2022-01-01 RX ADMIN — PIPERACILLIN AND TAZOBACTAM 4.5 G: 4; .5 INJECTION, POWDER, LYOPHILIZED, FOR SOLUTION INTRAVENOUS; PARENTERAL at 12:12

## 2022-01-01 RX ADMIN — PIPERACILLIN AND TAZOBACTAM 4.5 G: 4; .5 INJECTION, POWDER, LYOPHILIZED, FOR SOLUTION INTRAVENOUS; PARENTERAL at 07:12

## 2022-01-01 RX ADMIN — MUPIROCIN: 20 OINTMENT TOPICAL at 09:12

## 2022-01-01 RX ADMIN — IPRATROPIUM BROMIDE AND ALBUTEROL SULFATE 3 ML: 2.5; .5 SOLUTION RESPIRATORY (INHALATION) at 07:12

## 2022-01-01 RX ADMIN — POTASSIUM BICARBONATE 20 MEQ: 391 TABLET, EFFERVESCENT ORAL at 08:12

## 2022-01-01 RX ADMIN — CARVEDILOL 12.5 MG: 12.5 TABLET, FILM COATED ORAL at 08:12

## 2022-01-01 RX ADMIN — PIPERACILLIN AND TAZOBACTAM 4.5 G: 4; .5 INJECTION, POWDER, LYOPHILIZED, FOR SOLUTION INTRAVENOUS; PARENTERAL at 10:12

## 2022-01-01 RX ADMIN — VANCOMYCIN HYDROCHLORIDE 750 MG: 1 INJECTION, POWDER, LYOPHILIZED, FOR SOLUTION INTRAVENOUS at 11:12

## 2022-01-01 RX ADMIN — FLUCONAZOLE 200 MG: 200 INJECTION, SOLUTION INTRAVENOUS at 06:12

## 2022-01-01 RX ADMIN — HEPARIN SODIUM 5000 UNITS: 5000 INJECTION, SOLUTION INTRAVENOUS; SUBCUTANEOUS at 02:12

## 2022-01-01 RX ADMIN — SODIUM CHLORIDE: 9 INJECTION, SOLUTION INTRAVENOUS at 06:12

## 2022-01-01 RX ADMIN — LOSARTAN POTASSIUM 50 MG: 50 TABLET, FILM COATED ORAL at 10:12

## 2022-01-01 RX ADMIN — PANTOPRAZOLE SODIUM 40 MG: 40 INJECTION, POWDER, FOR SOLUTION INTRAVENOUS at 11:12

## 2022-01-01 RX ADMIN — SODIUM CHLORIDE: 9 INJECTION, SOLUTION INTRAVENOUS at 12:12

## 2022-01-01 RX ADMIN — IPRATROPIUM BROMIDE AND ALBUTEROL SULFATE 3 ML: 2.5; .5 SOLUTION RESPIRATORY (INHALATION) at 08:12

## 2022-01-01 RX ADMIN — PIPERACILLIN AND TAZOBACTAM 4.5 G: 4; .5 INJECTION, POWDER, LYOPHILIZED, FOR SOLUTION INTRAVENOUS; PARENTERAL at 08:12

## 2022-01-01 RX ADMIN — CARVEDILOL 12.5 MG: 12.5 TABLET, FILM COATED ORAL at 09:12

## 2022-01-01 RX ADMIN — BUDESONIDE 0.25 MG: 0.25 INHALANT RESPIRATORY (INHALATION) at 08:12

## 2022-01-01 RX ADMIN — ATORVASTATIN CALCIUM 20 MG: 20 TABLET, FILM COATED ORAL at 11:12

## 2022-01-01 RX ADMIN — PIPERACILLIN AND TAZOBACTAM 4.5 G: 4; .5 INJECTION, POWDER, LYOPHILIZED, FOR SOLUTION INTRAVENOUS; PARENTERAL at 03:12

## 2022-01-01 RX ADMIN — POTASSIUM BICARBONATE 20 MEQ: 391 TABLET, EFFERVESCENT ORAL at 05:12

## 2022-01-01 RX ADMIN — PANTOPRAZOLE SODIUM 40 MG: 40 INJECTION, POWDER, FOR SOLUTION INTRAVENOUS at 10:12

## 2022-01-01 RX ADMIN — HYDRALAZINE HYDROCHLORIDE 10 MG: 20 INJECTION INTRAMUSCULAR; INTRAVENOUS at 01:12

## 2022-01-01 RX ADMIN — POTASSIUM IODIDE 5 DROP: 1 SOLUTION ORAL at 02:12

## 2022-01-01 RX ADMIN — ATORVASTATIN CALCIUM 20 MG: 20 TABLET, FILM COATED ORAL at 10:12

## 2022-01-01 RX ADMIN — FLUCONAZOLE IN SODIUM CHLORIDE 400 MG: 2 INJECTION, SOLUTION INTRAVENOUS at 12:12

## 2022-01-01 RX ADMIN — PIPERACILLIN AND TAZOBACTAM 4.5 G: 4; .5 INJECTION, POWDER, LYOPHILIZED, FOR SOLUTION INTRAVENOUS; PARENTERAL at 09:12

## 2022-01-01 RX ADMIN — LOSARTAN POTASSIUM 50 MG: 50 TABLET, FILM COATED ORAL at 08:12

## 2022-01-01 RX ADMIN — Medication 1 CAPSULE: at 12:12

## 2022-01-01 RX ADMIN — Medication 1 CAPSULE: at 05:12

## 2022-01-01 RX ADMIN — PIPERACILLIN AND TAZOBACTAM 4.5 G: 4; .5 INJECTION, POWDER, LYOPHILIZED, FOR SOLUTION INTRAVENOUS; PARENTERAL at 06:12

## 2022-01-01 RX ADMIN — HEPARIN SODIUM 5000 UNITS: 5000 INJECTION, SOLUTION INTRAVENOUS; SUBCUTANEOUS at 09:12

## 2022-01-01 RX ADMIN — Medication 1 CAPSULE: at 08:12

## 2022-01-01 RX ADMIN — SODIUM CHLORIDE: 9 INJECTION, SOLUTION INTRAVENOUS at 08:12

## 2022-01-01 RX ADMIN — PANTOPRAZOLE SODIUM 40 MG: 40 INJECTION, POWDER, FOR SOLUTION INTRAVENOUS at 12:12

## 2022-01-01 RX ADMIN — MUPIROCIN: 20 OINTMENT TOPICAL at 08:12

## 2022-01-01 RX ADMIN — SODIUM CHLORIDE: 9 INJECTION, SOLUTION INTRAVENOUS at 03:12

## 2022-01-01 RX ADMIN — POTASSIUM BICARBONATE 20 MEQ: 391 TABLET, EFFERVESCENT ORAL at 04:12

## 2022-01-01 RX ADMIN — MUPIROCIN: 20 OINTMENT TOPICAL at 10:12

## 2022-01-01 RX ADMIN — HEPARIN SODIUM 5000 UNITS: 5000 INJECTION, SOLUTION INTRAVENOUS; SUBCUTANEOUS at 10:12

## 2022-12-09 PROBLEM — R13.19 ESOPHAGEAL DYSPHAGIA: Status: ACTIVE | Noted: 2022-01-01

## 2022-12-09 PROBLEM — I10 HYPERTENSION: Status: ACTIVE | Noted: 2022-01-01

## 2022-12-09 PROBLEM — J18.9 PNEUMONIA INVOLVING LEFT LUNG: Status: ACTIVE | Noted: 2022-01-01

## 2022-12-09 PROBLEM — B37.0 ORAL CANDIDIASIS: Status: ACTIVE | Noted: 2022-01-01

## 2022-12-09 PROBLEM — J44.1 COPD EXACERBATION: Status: ACTIVE | Noted: 2022-01-01

## 2022-12-09 PROBLEM — E43 SEVERE MALNUTRITION: Status: ACTIVE | Noted: 2022-01-01

## 2022-12-09 PROBLEM — J96.01 ACUTE HYPOXEMIC RESPIRATORY FAILURE: Status: ACTIVE | Noted: 2022-01-01

## 2022-12-10 PROBLEM — D64.9 CHRONIC ANEMIA: Status: ACTIVE | Noted: 2022-01-01

## 2022-12-10 NOTE — PT/OT/SLP EVAL
Physical Therapy Evaluation    Patient Name:  Olga Orellana   MRN:  [id number]    Recommendations:     Discharge Recommendations: nursing facility, skilled, rehabilitation facility   Discharge Equipment Recommendations: wheelchair   Barriers to discharge:  limited ability to self care    Assessment:     Olga Orellana is a 82 y.o. female admitted with a medical diagnosis of Pneumonia involving left lung.  She presents with the following impairments/functional limitations: gait instability, impaired balance, weakness, impaired self care skills, impaired functional mobility Patient with generalized weakness, muscle waisting, and chronically debilitated. Patient is able to be mobile with walker and a lot of assist. Not sure if patient will be able to regain independent mobility. Expect she will need 24/7 care from now on. Will work on patient mobility and safety awareness. Patient is high fall risk. .    Rehab Prognosis: Fair; patient would benefit from acute skilled PT services to address these deficits and reach maximum level of function.    Recent Surgery: * No surgery found *      Plan:     During this hospitalization, patient to be seen 5 x/week to address the identified rehab impairments via gait training, therapeutic activities and progress toward the following goals:    Plan of Care Expires:  12/10/22    Subjective     Chief Complaint: limited mobility, weakness  Patient/Family Comments/goals: Patient states she did live alone but plans on living with her daughter  Pain/Comfort:  Pain Rating 1: 0/10    Patients cultural, spiritual, Protestant conflicts given the current situation: no    Living Environment:  Plans on returning home with daughter  Prior to admission, patients level of function was ambulated with walker.  Equipment used at home: walker, rolling.  DME owned (not currently used): rolling walker.  Upon discharge, patient will have assistance from daughter.    Objective:     Communicated with nurse prior  to session.  Patient found supine with oxygen, peripheral IV  upon PT entry to room.    General Precautions: Standard, fall  Orthopedic Precautions:    Braces:    Respiratory Status: Nasal cannula, flow 2 L/min    Exams:  Cognitive Exam:  Patient is oriented to Person and Place  Postural Exam:  Patient presented with the following abnormalities:    -       Rounded shoulders  -       Forward head  -       Abnormal trunk flexion  -       Kyphosis  Sensation:    -       Intact  RLE ROM: WFL  RLE Strength: 3/5  LLE ROM: WFL  LLE Strength: 3/5    Functional Mobility:  Bed Mobility:     Supine to Sit: moderate assistance  Sit to Supine: moderate assistance  Transfers:     Sit to Stand:  moderate assistance with rolling walker  Bed to Chair: moderate assistance with  rolling walker  using  Squat Pivot  Gait: ambulated 15 feet mod assist, fwd flexed posture, cues to say closer to walker, shuffled steps, fatigues quickly  Balance: poor      AM-PAC 6 CLICK MOBILITY  Total Score:11       Treatment & Education:  Tx plan    Patient left supine with call button in reach.    GOALS:   Multidisciplinary Problems       Physical Therapy Goals          Problem: Physical Therapy    Goal Priority Disciplines Outcome Goal Variances Interventions   Physical Therapy Goal     PT, PT/OT Ongoing, Progressing     Description: Short Term Goals  Ambulate Min - 100 feet with rolling walker assistive device.   Supine to sit minimum  Sit to stand minimum  SPT min  5. Independent with HEP    Long Term Goals  Ambulate SBA - 200 feet with rolling walker assistive device.   Supine to sit contact guard  Sit to stand contact guard  SPT contact guard  Needed equipment for home.                              History:     Past Medical History:   Diagnosis Date    Adenocarcinoma of colon     Anemia     B12 deficiency     Bladder cancer     Chronic anemia 12/10/2022    Chronic gastritis     COPD (chronic obstructive pulmonary disease)     Coronary artery  disease     HLD (hyperlipidemia)     Hypertension     Osteoporosis     Paroxysmal SVT (supraventricular tachycardia)     Peripheral vascular disease        Past Surgical History:   Procedure Laterality Date    APPENDECTOMY      CATARACT EXTRACTION      COLONOSCOPY  03/16/2020    ENDARTERECTOMY OF FEMORAL ARTERY  10/27/2020    ESOPHAGOGASTRODUODENOSCOPY  03/16/2020    HYSTERECTOMY      ILIAC ARTERY STENT  08/2009    LAPAROSCOPIC PARTIAL COLECTOMY         Time Tracking:     PT Received On: 12/10/22  PT Start Time: 1540     PT Stop Time: 1600  PT Total Time (min): 20 min     Billable Minutes: Evaluation 20      12/10/2022

## 2022-12-10 NOTE — ASSESSMENT & PLAN NOTE
Presented to OSH with shortness of breath and hypoxia, found to have left lower lobe PNA on CXR. Procalcitonin elevated at 0.28, WBC 10 on admission. Blood cultures with no growth. She was started on IV rocephin and azithromycin for treatment. Given patient's history of COPD/structural lung disease and concern for aspiration PNA, will continue treatment with zosyn and levaquin. Repeat CXR pending.

## 2022-12-10 NOTE — ASSESSMENT & PLAN NOTE
Poor PO intake with history of esophageal strictures and dysphagia. GI to evaluate, may need PEG placement. Thin liquid diet currently and Ensure with meals.

## 2022-12-10 NOTE — PT/OT/SLP EVAL
Occupational Therapy   Evaluation    Name: Olga Orellana  MRN: [id number]  Admitting Diagnosis: Pneumonia involving left lung  Recent Surgery: * No surgery found *      Recommendations:     Discharge Recommendations: home with home health, nursing facility, skilled  Discharge Equipment Recommendations:   (to be determined)  Barriers to discharge:  None    Assessment:     Olga Orellana is a 82 y.o. female with a medical diagnosis of Pneumonia involving left lung.  She presents with no complaints.Pt agreed to OT evaluation. Performance deficits affecting function: weakness, impaired endurance, impaired self care skills, impaired functional mobility, gait instability, impaired balance (hearing impaired).      Rehab Prognosis: Good; patient would benefit from acute skilled OT services to address these deficits and reach maximum level of function.       Plan:     Patient to be seen 5 x/week to address the above listed problems via self-care/home management, therapeutic activities, therapeutic exercises  Plan of Care Expires:    Plan of Care Reviewed with: patient    Subjective     Chief Complaint: Pneumonia (B) Lungs  Patient/Family Comments/goals: To return home with daughter    Occupational Profile:  Living Environment: Pt lives alone in a mobile home wirh 3 steps and rail to enter. Pt has been staying with daughter for several days PTA. Pt report furniture cruising due to small space  Previous level of function: Pt reports being (I) with self care prior  Roles and Routines: Pt reports being (I) with self care prior  Equipment Used at Home: none  Assistance upon Discharge: Family    Pain/Comfort:  Pain Rating 1: 0/10  Pain Rating Post-Intervention 1: 0/10    Patients cultural, spiritual, Restorationist conflicts given the current situation: no    Objective:     Communicated with: LEOY Emanuel prior to session.  Patient found HOB elevated with oxygen, peripheral IV upon OT entry to room.    General Precautions: Standard,  fall  Orthopedic Precautions: N/A  Braces: N/A  Respiratory Status: Nasal cannula, flow 2 L/min    Occupational Performance:    Bed Mobility:    Patient completed Rolling/Turning to Left with  minimum assistance  Patient completed Supine to Sit with minimum assistance    Functional Mobility/Transfers:  Patient completed Sit <> Stand Transfer with minimum assistance and of x 2 persons  with  hand-held assist and gait belt  Patient completed Bed <> Chair Transfer using Step Transfer technique with minimum assistance  2 with hand-held assist and gait belt  Functional Mobility: Min a x 2 bed/chair    Activities of Daily Living:  Upper Body Dressing: moderate assistance donning gown as a robe    Cognitive/Visual Perceptual:  Cognitive/Psychosocial Skills:     -       Oriented to: Person and Situation   -       Follows Commands/attention:Follows one-step commands  -       Communication: hard of hearing  Visual/Perceptual:      -Vision wfl. (B) hearing aids .    Physical Exam:  Balance:    -       (S) with EOB sitting  Skin integrity: Visible skin intact  Edema:  None noted  Sensation:    -       Intact  Motor Planning:    -       wfl  Dominant hand:    -       Right  Upper Extremity Range of Motion:     -       Right Upper Extremity: WFL  -       Left Upper Extremity: WFL  Upper Extremity Strength:    -       Right Upper Extremity: WFL  -       Left Upper Extremity: WFL   Strength:    -       Right Upper Extremity: WFL  -       Left Upper Extremity: WFL    AMPAC 6 Click ADL:  AMPAC Total Score: 15    Treatment & Education:  OT evaluation completed. See eval for details.  Pt educated on OT role/POC.   Importance of OOB activity with staff assistance.  Importance of sitting up in the chair throughout the day as tolerated, especially for meals   Safety during functional t/f and mobility with use of AD as needed,  Importance of assisting with self-care activities   All questions/concerns answered within OT scope of  practice     Patient left up in chair with all lines intact, call button in reach, and nurse notified    GOALS:   Multidisciplinary Problems       Occupational Therapy Goals          Problem: Occupational Therapy    Goal Priority Disciplines Outcome Interventions   Occupational Therapy Goal     OT, PT/OT Ongoing, Progressing    Description: STG:  Pt will perform grooming with setup  Pt will bathe with setup  Pt will perform UE dressing with (I)  Pt will perform LE dressing with (I)/Mod I  Pt will sit EOB x 10 min (I)  Pt will transfer bed/chair/bsc with CGA  Pt will perform standing task x 2 min with CGA   Pt will tolerate 20 minutes of tx without fatigue      LT.Restore to max I with self care and mobility.                          History:     Past Medical History:   Diagnosis Date    Adenocarcinoma of colon     Anemia     B12 deficiency     Bladder cancer     Chronic anemia 12/10/2022    Chronic gastritis     COPD (chronic obstructive pulmonary disease)     Coronary artery disease     HLD (hyperlipidemia)     Hypertension     Osteoporosis     Paroxysmal SVT (supraventricular tachycardia)     Peripheral vascular disease          Past Surgical History:   Procedure Laterality Date    APPENDECTOMY      CATARACT EXTRACTION      COLONOSCOPY  2020    ENDARTERECTOMY OF FEMORAL ARTERY  10/27/2020    ESOPHAGOGASTRODUODENOSCOPY  2020    HYSTERECTOMY      ILIAC ARTERY STENT  2009    LAPAROSCOPIC PARTIAL COLECTOMY         Time Tracking:     OT Date of Treatment: 12/10/22  OT Start Time: 08  OT Stop Time: 0845  OT Total Time (min): 28 min    Billable Minutes:Evaluation 28    12/10/2022

## 2022-12-10 NOTE — HPI
81yo F with PMH esophageal strictures with dilation, dysphagia, COPD, HTN presents as transfer from [facility].     Patient initially presented with shortness of breath and found to have left lower lobe pneumonia. She was started on IV antibiotics with rocephin and azithromycin as well as solumedrol for COPD exacerbation. Patient also with poor PO intake and weight loss. She has required three esophageal dilations in the past. Swallow evaluation showed esophageal dysphagia with recommendation of thin liquid diet as well as oral and esophageal candidiasis. Patient was transferred to [facility] for GI evaluation of dysphagia.

## 2022-12-10 NOTE — H&P
[facility] Hospital Medicine  History & Physical    Patient Name: Olga Orellana  MRN: [id number]  Patient Class: IP- Inpatient  Admission Date: 12/9/2022  Attending Physician: Hayden Rao Jr., MD   Primary Care Provider: Primary Doctor No      Patient information was obtained from patient and past medical records.     Subjective:     Principal Problem:Pneumonia involving left lung    Chief Complaint:   Chief Complaint   Patient presents with    Dysphagia        HPI: 83yo F with PMH esophageal strictures with dilation, dysphagia, COPD, HTN presents as transfer from [facility].     Patient initially presented with shortness of breath and found to have left lower lobe pneumonia. She was started on IV antibiotics with rocephin and azithromycin as well as solumedrol for COPD exacerbation. Patient also with poor PO intake and weight loss. She has required three esophageal dilations in the past. Swallow evaluation showed esophageal dysphagia with recommendation of thin liquid diet as well as oral and esophageal candidiasis. Patient was transferred to [facility] for GI evaluation of dysphagia.       Past Medical History:   Diagnosis Date    Adenocarcinoma of colon     Anemia     B12 deficiency     Bladder cancer     Chronic gastritis     COPD (chronic obstructive pulmonary disease)     Coronary artery disease     HLD (hyperlipidemia)     Hypertension     Osteoporosis     Paroxysmal SVT (supraventricular tachycardia)     Peripheral vascular disease        Past Surgical History:   Procedure Laterality Date    APPENDECTOMY      CATARACT EXTRACTION      COLONOSCOPY  03/16/2020    ENDARTERECTOMY OF FEMORAL ARTERY  10/27/2020    ESOPHAGOGASTRODUODENOSCOPY  03/16/2020    HYSTERECTOMY      ILIAC ARTERY STENT  08/2009    LAPAROSCOPIC PARTIAL COLECTOMY         Review of patient's allergies indicates:   Allergen Reactions    Iron Other (See Comments)     IV  IRON DEXTRAN:  CHEST PAIN  IV IRON DEXTRAN:  CHEST PAIN      Codeine Nausea And Vomiting    Hydrocodone        No current facility-administered medications on file prior to encounter.     Current Outpatient Medications on File Prior to Encounter   Medication Sig    carvediloL (COREG) 12.5 MG tablet Take 12.5 mg by mouth 2 (two) times a day.    famotidine (PEPCID) 40 MG tablet Take 40 mg by mouth once daily.    aspirin 81 mg Cap Take 81 mg by mouth once daily.    losartan (COZAAR) 25 MG tablet Take 25 mg by mouth once daily.    rosuvastatin (CRESTOR) 5 MG tablet Take 5 mg by mouth once daily.     Family History       Family history is unknown by patient.          Tobacco Use    Smoking status: Every Day     Types: Cigarettes    Smokeless tobacco: Never   Substance and Sexual Activity    Alcohol use: Never    Drug use: Not on file    Sexual activity: Never     Review of Systems   Constitutional:  Negative for chills and fever.   HENT:  Positive for trouble swallowing.    Respiratory:  Positive for cough, shortness of breath and wheezing.    Cardiovascular:  Negative for chest pain, palpitations and leg swelling.   Gastrointestinal:  Negative for abdominal pain, constipation, diarrhea, nausea and vomiting.   Musculoskeletal:  Negative for myalgias.   Skin:  Negative for rash.   Neurological:  Negative for dizziness, light-headedness and headaches.   Hematological:  Bruises/bleeds easily.   Psychiatric/Behavioral:  Negative for agitation and confusion.    Objective:     Vital Signs (Most Recent):  Temp: 98 °F (36.7 °C) (12/09/22 1900)  Pulse: 94 (12/09/22 1900)  Resp: 18 (12/09/22 1900)  BP: (!) 200/62 (12/09/22 1900)  SpO2: 96 % (12/09/22 1843)   Vital Signs (24h Range):  Temp:  [98 °F (36.7 °C)-98.7 °F (37.1 °C)] 98 °F (36.7 °C)  Pulse:  [73-94] 94  Resp:  [18] 18  SpO2:  [96 %] 96 %  BP: (195-200)/(62) 200/62        There is no height or weight on file to calculate BMI.    Physical Exam  Vitals reviewed.    Constitutional:       General: She is not in acute distress.     Comments: Chronically ill appearing, significantly malnourished   HENT:      Head: Normocephalic and atraumatic.   Eyes:      General: No scleral icterus.     Extraocular Movements: Extraocular movements intact.      Pupils: Pupils are equal, round, and reactive to light.   Cardiovascular:      Rate and Rhythm: Normal rate and regular rhythm.      Heart sounds: Murmur heard.   Pulmonary:      Effort: Pulmonary effort is normal. No respiratory distress.      Breath sounds: Wheezing and rhonchi present.   Abdominal:      General: Abdomen is flat. Bowel sounds are normal. There is no distension.      Palpations: Abdomen is soft. There is no mass.      Tenderness: There is no abdominal tenderness.   Musculoskeletal:      Cervical back: Normal range of motion and neck supple.      Right lower leg: No edema.      Left lower leg: No edema.   Skin:     General: Skin is warm and dry.      Findings: Bruising present. No rash.   Neurological:      General: No focal deficit present.      Mental Status: She is alert and oriented to person, place, and time.   Psychiatric:         Mood and Affect: Mood normal.         Behavior: Behavior normal.         CRANIAL NERVES     CN III, IV, VI   Pupils are equal, round, and reactive to light.     Significant Labs: All pertinent labs within the past 24 hours have been reviewed.    Significant Imaging: I have reviewed all pertinent imaging results/findings within the past 24 hours.    Assessment/Plan:     * Pneumonia involving left lung  Presented to OSH with shortness of breath and hypoxia, found to have left lower lobe PNA on CXR. Procalcitonin elevated at 0.28, WBC 10 on admission. Blood cultures with no growth. She was started on IV rocephin and azithromycin for treatment. Given patient's history of COPD/structural lung disease and concern for aspiration PNA, will continue treatment with zosyn and levaquin. Repeat CXR  pending.     Esophageal dysphagia  Has history of esophageal strictures requiring dilation x3 in the past. Worsened dysphagia at present with poor PO intake. Swallow evaluation at OSH recommend thin liquid diet for now. Will consult GI to evaluate need for repeat esophageal dilation vs possible PEG placement, appreciate assistance.     COPD exacerbation  - Longstanding history of smoking, on no inhalers at home  - Wheezing and rhonchi on exam; CXR pending  - Started on steroids at OSH, currently solumedrol 40mg q12; will wean as tolerated  - Levaquin for LLL PNA as above  - Duonebs q4h, budesonide q12    Severe malnutrition  Poor PO intake with history of esophageal strictures and dysphagia. GI to evaluate, may need PEG placement. Thin liquid diet currently and Ensure with meals.     Hypertension  Will continue home medications.     Oral candidiasis  Oral candidiasis on exam and noted in swallow evaluation. Will treat with fluconazole.     VTE Risk Mitigation (From admission, onward)         Ordered     heparin (porcine) injection 5,000 Units  Every 12 hours         12/09/22 2123     IP VTE HIGH RISK PATIENT  Once         12/09/22 2123     Place sequential compression device  Until discontinued         12/09/22 2123                 Jocy Kumar MD  Department of Hospital Medicine   [facility]

## 2022-12-10 NOTE — ASSESSMENT & PLAN NOTE
Presented to OSH with shortness of breath and hypoxia, found to have left lower lobe PNA on CXR. Procalcitonin elevated at 0.28, WBC 10 on admission. Blood cultures with no growth. She was started on IV rocephin and azithromycin for treatment. Given patient's history of COPD/structural lung disease and concern for aspiration PNA.    Continue treatment with zosyn and levaquin (12/9 - ).   Repeat CXR (12/10) reveals patchy airspace opacities within the lower lobes, probably pneumonia. Small bilateral pleural effusions.

## 2022-12-10 NOTE — PLAN OF CARE
Problem: Occupational Therapy  Goal: Occupational Therapy Goal  Description: STG:  Pt will perform grooming with setup  Pt will bathe with setup  Pt will perform UE dressing with (I)  Pt will perform LE dressing with (I)/Mod I  Pt will sit EOB x 10 min (I)  Pt will transfer bed/chair/bsc with CGA  Pt will perform standing task x 2 min with CGA   Pt will tolerate 20 minutes of tx without fatigue      LT.Restore to max I with self care and mobility.     Outcome: Ongoing, Progressing

## 2022-12-10 NOTE — SUBJECTIVE & OBJECTIVE
Interval History: Patient sitting comfortably on chair during AM rounds. NAD. Patient denies dysphagia but reports copious fluids in mouth. Continue IV ATX, breathing treatments, and speech eval pending. GI tried to do EGD but [facility] admissions refused to admit her for EGD. GI recommends iron, ferritin, TIBC labs. Consider barium UGI after speech consult.     Review of Systems   Constitutional:  Negative for chills and fever.   HENT:  Positive for trouble swallowing.    Respiratory:  Positive for cough, shortness of breath and wheezing.    Cardiovascular:  Negative for chest pain, palpitations and leg swelling.   Gastrointestinal:  Negative for abdominal pain, constipation, diarrhea, nausea and vomiting.   Musculoskeletal:  Negative for myalgias.   Skin:  Negative for rash.   Neurological:  Negative for dizziness, light-headedness and headaches.   Hematological:  Bruises/bleeds easily.   Psychiatric/Behavioral:  Negative for agitation and confusion.    Objective:     Vital Signs (Most Recent):  Temp: 96.2 °F (35.7 °C) (12/10/22 1600)  Pulse: 60 (12/10/22 1600)  Resp: 18 (12/10/22 1600)  BP: (!) 152/53 (12/10/22 1600)  SpO2: (!) 90 % (12/10/22 1600)   Vital Signs (24h Range):  Temp:  [96.2 °F (35.7 °C)-98.7 °F (37.1 °C)] 96.2 °F (35.7 °C)  Pulse:  [58-94] 60  Resp:  [18-19] 18  SpO2:  [90 %-98 %] 90 %  BP: (152-200)/(53-86) 152/53     Weight: 37.9 kg (83 lb 8 oz)  There is no height or weight on file to calculate BMI.    Intake/Output Summary (Last 24 hours) at 12/10/2022 1747  Last data filed at 12/9/2022 2310  Gross per 24 hour   Intake --   Output 300 ml   Net -300 ml      Physical Exam  Vitals reviewed.   Constitutional:       General: She is not in acute distress.     Comments: Chronically ill appearing, significantly malnourished   HENT:      Head: Normocephalic and atraumatic.   Eyes:      General: No scleral icterus.     Extraocular Movements: Extraocular movements intact.      Pupils: Pupils are equal,  round, and reactive to light.   Cardiovascular:      Rate and Rhythm: Normal rate and regular rhythm.      Heart sounds: Murmur heard.   Pulmonary:      Effort: Pulmonary effort is normal. No respiratory distress.      Breath sounds: Wheezing and rhonchi present.   Abdominal:      General: Abdomen is flat. Bowel sounds are normal. There is no distension.      Palpations: Abdomen is soft. There is no mass.      Tenderness: There is no abdominal tenderness.   Musculoskeletal:      Cervical back: Normal range of motion and neck supple.      Right lower leg: No edema.      Left lower leg: No edema.   Skin:     General: Skin is warm and dry.      Findings: Bruising present. No rash.   Neurological:      General: No focal deficit present.      Mental Status: She is alert and oriented to person, place, and time.   Psychiatric:         Mood and Affect: Mood normal.         Behavior: Behavior normal.       Significant Labs: All pertinent labs within the past 24 hours have been reviewed.    Significant Imaging: I have reviewed all pertinent imaging results/findings within the past 24 hours.

## 2022-12-10 NOTE — ASSESSMENT & PLAN NOTE
- Longstanding history of smoking, on no inhalers at home  - Wheezing and rhonchi on exam; CXR pending  - Started on steroids at OSH, currently solumedrol 40mg q12; will wean as tolerated  - Levaquin for LLL PNA as above  - Duonebs q4h, budesonide q12

## 2022-12-10 NOTE — SUBJECTIVE & OBJECTIVE
Past Medical History:   Diagnosis Date    Adenocarcinoma of colon     Anemia     B12 deficiency     Bladder cancer     Chronic gastritis     COPD (chronic obstructive pulmonary disease)     Coronary artery disease     HLD (hyperlipidemia)     Hypertension     Osteoporosis     Paroxysmal SVT (supraventricular tachycardia)     Peripheral vascular disease        Past Surgical History:   Procedure Laterality Date    APPENDECTOMY      CATARACT EXTRACTION      COLONOSCOPY  03/16/2020    ENDARTERECTOMY OF FEMORAL ARTERY  10/27/2020    ESOPHAGOGASTRODUODENOSCOPY  03/16/2020    HYSTERECTOMY      ILIAC ARTERY STENT  08/2009    LAPAROSCOPIC PARTIAL COLECTOMY         Review of patient's allergies indicates:   Allergen Reactions    Iron Other (See Comments)     IV IRON DEXTRAN:  CHEST PAIN  IV IRON DEXTRAN:  CHEST PAIN      Codeine Nausea And Vomiting    Hydrocodone        No current facility-administered medications on file prior to encounter.     Current Outpatient Medications on File Prior to Encounter   Medication Sig    carvediloL (COREG) 12.5 MG tablet Take 12.5 mg by mouth 2 (two) times a day.    famotidine (PEPCID) 40 MG tablet Take 40 mg by mouth once daily.    aspirin 81 mg Cap Take 81 mg by mouth once daily.    losartan (COZAAR) 25 MG tablet Take 25 mg by mouth once daily.    rosuvastatin (CRESTOR) 5 MG tablet Take 5 mg by mouth once daily.     Family History       Family history is unknown by patient.          Tobacco Use    Smoking status: Every Day     Types: Cigarettes    Smokeless tobacco: Never   Substance and Sexual Activity    Alcohol use: Never    Drug use: Not on file    Sexual activity: Never     Review of Systems   Constitutional:  Negative for chills and fever.   HENT:  Positive for trouble swallowing.    Respiratory:  Positive for cough, shortness of breath and wheezing.    Cardiovascular:  Negative for chest pain, palpitations and leg swelling.   Gastrointestinal:  Negative for abdominal pain,  constipation, diarrhea, nausea and vomiting.   Musculoskeletal:  Negative for myalgias.   Skin:  Negative for rash.   Neurological:  Negative for dizziness, light-headedness and headaches.   Hematological:  Bruises/bleeds easily.   Psychiatric/Behavioral:  Negative for agitation and confusion.    Objective:     Vital Signs (Most Recent):  Temp: 98 °F (36.7 °C) (12/09/22 1900)  Pulse: 94 (12/09/22 1900)  Resp: 18 (12/09/22 1900)  BP: (!) 200/62 (12/09/22 1900)  SpO2: 96 % (12/09/22 1843)   Vital Signs (24h Range):  Temp:  [98 °F (36.7 °C)-98.7 °F (37.1 °C)] 98 °F (36.7 °C)  Pulse:  [73-94] 94  Resp:  [18] 18  SpO2:  [96 %] 96 %  BP: (195-200)/(62) 200/62        There is no height or weight on file to calculate BMI.    Physical Exam  Vitals reviewed.   Constitutional:       General: She is not in acute distress.     Comments: Chronically ill appearing, significantly malnourished   HENT:      Head: Normocephalic and atraumatic.   Eyes:      General: No scleral icterus.     Extraocular Movements: Extraocular movements intact.      Pupils: Pupils are equal, round, and reactive to light.   Cardiovascular:      Rate and Rhythm: Normal rate and regular rhythm.      Heart sounds: Murmur heard.   Pulmonary:      Effort: Pulmonary effort is normal. No respiratory distress.      Breath sounds: Wheezing and rhonchi present.   Abdominal:      General: Abdomen is flat. Bowel sounds are normal. There is no distension.      Palpations: Abdomen is soft. There is no mass.      Tenderness: There is no abdominal tenderness.   Musculoskeletal:      Cervical back: Normal range of motion and neck supple.      Right lower leg: No edema.      Left lower leg: No edema.   Skin:     General: Skin is warm and dry.      Findings: Bruising present. No rash.   Neurological:      General: No focal deficit present.      Mental Status: She is alert and oriented to person, place, and time.   Psychiatric:         Mood and Affect: Mood normal.          Behavior: Behavior normal.         CRANIAL NERVES     CN III, IV, VI   Pupils are equal, round, and reactive to light.     Significant Labs: All pertinent labs within the past 24 hours have been reviewed.    Significant Imaging: I have reviewed all pertinent imaging results/findings within the past 24 hours.

## 2022-12-10 NOTE — HOSPITAL COURSE
Patient admitted for aspiration pneumonia secondary to esophageal dysphagia and treated with IV antibiotics. COPD exacerbation treated with IV steroids and supplemental oxygen. Candidiasis treated with systemic fluconazole. GI was consulted for possible PEG placement vs esophageal dilation, however EGD not performed pending improvement in patient's respiratory status. Further workup remarkable for bilateral pleural effusions for which she underwent thoracentesis after evaluation by pulmonology. Unable to perform bronchoscopy due to patient's tenuous respiratory status.    Patient's respiratory status continued to decline and patient became obtunded with ABG significant for acute on chronic hypercapnic respiratory failure. Patient was placed on bipap and transferred to the high observation unit. Discussion with daughter regarding goals of care, decision was made to make patient DNR with no CPR, intubation, or vasopressors and wished for patient to be kept comfortable.      Overnight, called to see patient for asystole on telemetry. On exam, patient did not respond to verbal or physical stimuli. Absent heart sounds, no spontaneous respirations. Absent peripheral pulses. Pupils fixed and dilated. Time of death 0408. Family notified.

## 2022-12-10 NOTE — CONSULTS
[facility]  Gastroenterology  Consult Note    Patient Name: Olga Orellana  MRN: [id number]  Admission Date: 12/9/2022  Hospital Length of Stay: 1 days  Code Status: Full Code   Attending Provider: Hayden Rao Jr., MD   Consulting Provider: Amilcar Sexton MD  Primary Care Physician: Primary Doctor No  Principal Problem:Pneumonia involving left lung    Inpatient consult to Gastroenterology  Consult performed by: Amilcar Sexton MD  Consult ordered by: Jeremiah Ivy DO      Subjective:     HPI: Gi consult was received for PEG placement vs egd with dilation. Pt was brought to GI Lab from [facility], but [facility] admissions refused to admit her for GI procedure. The patient denies dysphagia and states that she has to clear her throat frequently and has copious mucous in her mouth.    Past Medical History:   Diagnosis Date    Adenocarcinoma of colon     Anemia     B12 deficiency     Bladder cancer     Chronic gastritis     COPD (chronic obstructive pulmonary disease)     Coronary artery disease     HLD (hyperlipidemia)     Hypertension     Osteoporosis     Paroxysmal SVT (supraventricular tachycardia)     Peripheral vascular disease        Past Surgical History:   Procedure Laterality Date    APPENDECTOMY      CATARACT EXTRACTION      COLONOSCOPY  03/16/2020    ENDARTERECTOMY OF FEMORAL ARTERY  10/27/2020    ESOPHAGOGASTRODUODENOSCOPY  03/16/2020    HYSTERECTOMY      ILIAC ARTERY STENT  08/2009    LAPAROSCOPIC PARTIAL COLECTOMY         Review of patient's allergies indicates:   Allergen Reactions    Iron Other (See Comments)     IV IRON DEXTRAN:  CHEST PAIN  IV IRON DEXTRAN:  CHEST PAIN      Codeine Nausea And Vomiting    Hydrocodone      Family History       Family history is unknown by patient.          Tobacco Use    Smoking status: Every Day     Types: Cigarettes    Smokeless tobacco: Never   Substance and Sexual Activity    Alcohol use: Never    Drug use: Not on file     Sexual activity: Never     Review of Systems   Respiratory:  Positive for shortness of breath.    Cardiovascular: Negative.    Gastrointestinal: Negative.    Objective:     Vital Signs (Most Recent):  Temp: 97.7 °F (36.5 °C) (12/10/22 0800)  Pulse: 63 (12/10/22 0800)  Resp: 18 (12/10/22 0800)  BP: (!) 187/70 (12/10/22 0800)  SpO2: 95 % (12/10/22 0800)   Vital Signs (24h Range):  Temp:  [97.1 °F (36.2 °C)-98.7 °F (37.1 °C)] 97.7 °F (36.5 °C)  Pulse:  [58-94] 63  Resp:  [18] 18  SpO2:  [95 %-96 %] 95 %  BP: (165-200)/(62-79) 187/70     Weight: 37.9 kg (83 lb 8 oz) (12/10/22 0536)  There is no height or weight on file to calculate BMI.      Intake/Output Summary (Last 24 hours) at 12/10/2022 0928  Last data filed at 12/9/2022 2310  Gross per 24 hour   Intake --   Output 300 ml   Net -300 ml       Lines/Drains/Airways       Peripheral Intravenous Line  Duration                  Peripheral IV - Single Lumen 20 G Left Antecubital -- days                    Physical Exam  Vitals reviewed.   Constitutional:       General: She is not in acute distress.     Appearance: Normal appearance. She is well-developed. She is ill-appearing.   HENT:      Head: Normocephalic and atraumatic.      Nose: Nose normal.   Eyes:      Pupils: Pupils are equal, round, and reactive to light.   Cardiovascular:      Rate and Rhythm: Normal rate and regular rhythm.   Pulmonary:      Effort: Pulmonary effort is normal.      Breath sounds: Normal breath sounds. No wheezing.   Abdominal:      General: Abdomen is flat. Bowel sounds are normal. There is no distension.      Palpations: Abdomen is soft.      Tenderness: There is no abdominal tenderness. There is no guarding.   Skin:     General: Skin is warm and dry.      Coloration: Skin is not jaundiced.      Findings: Bruising present.   Neurological:      Mental Status: She is alert.   Psychiatric:         Attention and Perception: Attention normal.         Mood and Affect: Affect normal.          Speech: Speech normal.         Behavior: Behavior is cooperative.      Comments: Pt was calm while speaking.       Significant Labs:  CBC:   Recent Labs   Lab 12/10/22  0212   WBC 7.45   HGB 8.5*   HCT 27.1*        CMP:   Recent Labs   Lab 12/10/22  0212   GLU 81   CALCIUM 7.4*   ALBUMIN 1.8*   PROT 4.5*      K 3.2*   CO2 23   *   BUN 16   CREATININE 0.66   ALKPHOS 74   ALT 14   AST 22   BILITOT 0.4       Significant Imaging:  Imaging results within the past 24 hours have been reviewed.    Assessment/Plan:     Active Diagnoses:    Diagnosis Date Noted POA    PRINCIPAL PROBLEM:  Pneumonia involving left lung [J18.9] 12/09/2022 Yes    COPD exacerbation [J44.1] 12/09/2022 Yes    Esophageal dysphagia [R13.19] 12/09/2022 Yes    Hypertension [I10] 12/09/2022 Yes    Severe malnutrition [E43] 12/09/2022 Yes    Oral candidiasis [B37.0] 12/09/2022 Yes      Problems Resolved During this Admission:       Imp: anemia, probable pneumonia. Hospital admission complications between [facility] and [facility] re: admitting pts for GI Procedures.  Rec: Check iron, ferritin, tibc; consider barium UGI vs Speech therapy evalution. I'm available for EGD if procedure becomes urgent.    Thank you for your consult. I will follow-up with patient. Please contact us if you have any additional questions.    Amilcar Sexton MD  Gastroenterology  [facility]

## 2022-12-10 NOTE — NURSING
Received patient by ambulance via stretcher from [facility]. Daughter with patient. No distress noted upon arrival.

## 2022-12-10 NOTE — ASSESSMENT & PLAN NOTE
Has history of esophageal strictures requiring dilation x3 in the past. Worsened dysphagia at present with poor PO intake. Swallow evaluation at OSH recommend thin liquid diet for now. Will consult GI to evaluate need for repeat esophageal dilation vs possible PEG placement, appreciate assistance.

## 2022-12-10 NOTE — PLAN OF CARE
Problem: Physical Therapy  Goal: Physical Therapy Goal  Description: Short Term Goals  Ambulate Min - 100 feet with rolling walker assistive device.   Supine to sit minimum  Sit to stand minimum  SPT min  5. Independent with HEP    Long Term Goals  Ambulate SBA - 200 feet with rolling walker assistive device.   Supine to sit contact guard  Sit to stand contact guard  SPT contact guard  Needed equipment for home.         Outcome: Ongoing, Progressing

## 2022-12-11 NOTE — ASSESSMENT & PLAN NOTE
Presented to OSH with shortness of breath and hypoxia, found to have left lower lobe PNA on CXR. Procalcitonin elevated at 0.28, WBC 10 on admission; WBC today of 15.67. Blood cultures with no growth. She was started on IV rocephin and azithromycin for treatment. Given patient's history of COPD/structural lung disease and concern for aspiration PNA.  Continue treatment with zosyn and levaquin   Repeat CXR (12/10) reveals patchy airspace opacities within the lower lobes, probably pneumonia. Small bilateral pleural effusions.

## 2022-12-11 NOTE — PROGRESS NOTES
[facility] Medicine  Progress Note    Patient Name: Olga Orellana  MRN: [id number]  Patient Class: IP- Inpatient   Admission Date: 12/9/2022  Length of Stay: 1 days  Attending Physician: Hayden Rao Jr., MD  Primary Care Provider: Primary Doctor No        Subjective:     Principal Problem:Pneumonia involving left lung        HPI:  83yo F with PMH esophageal strictures with dilation, dysphagia, COPD, HTN presents as transfer from [facility].     Patient initially presented with shortness of breath and found to have left lower lobe pneumonia. She was started on IV antibiotics with rocephin and azithromycin as well as solumedrol for COPD exacerbation. Patient also with poor PO intake and weight loss. She has required three esophageal dilations in the past. Swallow evaluation showed esophageal dysphagia with recommendation of thin liquid diet as well as oral and esophageal candidiasis. Patient was transferred to [facility] for GI evaluation of dysphagia.       Overview/Hospital Course:  12/9: admitted for LLL PNA on IV Rocephin, azithromycin, COPD exacerbation on Solumedrol, Duonebs, budesonide, esophageal dysphagia pending GI consult.  12/10: continue IV ATX, breathing treatments, speech eval pending and potential barium UGI PRN after speech consult.      Interval History: Patient sitting comfortably on chair during AM rounds. NAD. Patient denies dysphagia but reports copious fluids in mouth. Continue IV ATX, breathing treatments, and speech eval pending. GI tried to do EGD but [facility] refused to admit her for EGD. GI recommends iron, ferritin, TIBC labs. Consider barium UGI after speech consult.     Review of Systems   Constitutional:  Negative for chills and fever.   HENT:  Positive for trouble swallowing.    Respiratory:  Positive for cough, shortness of breath and wheezing.    Cardiovascular:  Negative for chest pain, palpitations and  leg swelling.   Gastrointestinal:  Negative for abdominal pain, constipation, diarrhea, nausea and vomiting.   Musculoskeletal:  Negative for myalgias.   Skin:  Negative for rash.   Neurological:  Negative for dizziness, light-headedness and headaches.   Hematological:  Bruises/bleeds easily.   Psychiatric/Behavioral:  Negative for agitation and confusion.    Objective:     Vital Signs (Most Recent):  Temp: 96.2 °F (35.7 °C) (12/10/22 1600)  Pulse: 60 (12/10/22 1600)  Resp: 18 (12/10/22 1600)  BP: (!) 152/53 (12/10/22 1600)  SpO2: (!) 90 % (12/10/22 1600)   Vital Signs (24h Range):  Temp:  [96.2 °F (35.7 °C)-98.7 °F (37.1 °C)] 96.2 °F (35.7 °C)  Pulse:  [58-94] 60  Resp:  [18-19] 18  SpO2:  [90 %-98 %] 90 %  BP: (152-200)/(53-86) 152/53     Weight: 37.9 kg (83 lb 8 oz)  There is no height or weight on file to calculate BMI.    Intake/Output Summary (Last 24 hours) at 12/10/2022 1747  Last data filed at 12/9/2022 2310  Gross per 24 hour   Intake --   Output 300 ml   Net -300 ml      Physical Exam  Vitals reviewed.   Constitutional:       General: She is not in acute distress.     Comments: Chronically ill appearing, significantly malnourished   HENT:      Head: Normocephalic and atraumatic.   Eyes:      General: No scleral icterus.     Extraocular Movements: Extraocular movements intact.      Pupils: Pupils are equal, round, and reactive to light.   Cardiovascular:      Rate and Rhythm: Normal rate and regular rhythm.      Heart sounds: Murmur heard.   Pulmonary:      Effort: Pulmonary effort is normal. No respiratory distress.      Breath sounds: Wheezing and rhonchi present.   Abdominal:      General: Abdomen is flat. Bowel sounds are normal. There is no distension.      Palpations: Abdomen is soft. There is no mass.      Tenderness: There is no abdominal tenderness.   Musculoskeletal:      Cervical back: Normal range of motion and neck supple.      Right lower leg: No edema.      Left lower leg: No edema.    Skin:     General: Skin is warm and dry.      Findings: Bruising present. No rash.   Neurological:      General: No focal deficit present.      Mental Status: She is alert and oriented to person, place, and time.   Psychiatric:         Mood and Affect: Mood normal.         Behavior: Behavior normal.       Significant Labs: All pertinent labs within the past 24 hours have been reviewed.    Significant Imaging: I have reviewed all pertinent imaging results/findings within the past 24 hours.      Assessment/Plan:      * Pneumonia involving left lung  Presented to OSH with shortness of breath and hypoxia, found to have left lower lobe PNA on CXR. Procalcitonin elevated at 0.28, WBC 10 on admission. Blood cultures with no growth. She was started on IV rocephin and azithromycin for treatment. Given patient's history of COPD/structural lung disease and concern for aspiration PNA.    Continue treatment with zosyn and levaquin (12/9 - ).   Repeat CXR (12/10) reveals patchy airspace opacities within the lower lobes, probably pneumonia. Small bilateral pleural effusions.    Chronic anemia        Oral candidiasis  Oral candidiasis on exam and noted in swallow evaluation. Will treat with fluconazole.      Severe malnutrition  Poor PO intake with history of esophageal strictures and dysphagia. GI to evaluate, may need PEG placement. Thin liquid diet currently and Ensure with meals.      Hypertension  Will continue home medications.      Esophageal dysphagia  Has history of esophageal strictures requiring dilation x3 in the past. Worsened dysphagia at present with poor PO intake. Swallow evaluation at OSH recommend thin liquid diet for now. Will consult GI to evaluate need for repeat esophageal dilation vs possible PEG placement, appreciate assistance.   Swallow eval pending  Will consider GI barium swallow after speech evaluation.  Reconsult GI if acute changes in condition      COPD exacerbation  - Longstanding history of  smoking, on no inhalers at home  - Wheezing and rhonchi on exam; CXR   - Started on steroids at OSH, currently solumedrol 40mg q12; will wean as tolerated  - Levaquin for LLL PNA as above  - Duonebs q4h, budesonide q12      VTE Risk Mitigation (From admission, onward)         Ordered     heparin (porcine) injection 5,000 Units  Every 12 hours         12/09/22 2123     IP VTE HIGH RISK PATIENT  Once         12/09/22 2123     Place sequential compression device  Until discontinued         12/09/22 2123                Discharge Planning   GASTON:      Code Status: Prior   Is the patient medically ready for discharge?:     Reason for patient still in hospital (select all that apply): Patient trending condition, Laboratory test, Treatment and Pending disposition                     Jeremiah Ivy DO  Department of Hospital Medicine   [facility]

## 2022-12-11 NOTE — ASSESSMENT & PLAN NOTE
Has history of esophageal strictures requiring dilation x3 in the past. Worsened dysphagia at present with poor PO intake. Swallow evaluation at OSH recommend thin liquid diet for now. Will consult GI to evaluate need for repeat esophageal dilation vs possible PEG placement, appreciate assistance.   Swallow eval pending  Will consider GI barium swallow after speech evaluation.  Reconsult GI if acute changes in condition

## 2022-12-11 NOTE — PROGRESS NOTES
[facility] Medicine  Progress Note    Patient Name: Olga Orellana  MRN: [id number]  Patient Class: IP- Inpatient   Admission Date: 12/9/2022  Length of Stay: 2 days  Attending Physician: Hayden Rao Jr., MD  Primary Care Provider: Primary Doctor No        Subjective:     Principal Problem:Pneumonia involving left lung        HPI:  81yo F with PMH esophageal strictures with dilation, dysphagia, COPD, HTN presents as transfer from [facility].     Patient initially presented with shortness of breath and found to have left lower lobe pneumonia. She was started on IV antibiotics with rocephin and azithromycin as well as solumedrol for COPD exacerbation. Patient also with poor PO intake and weight loss. She has required three esophageal dilations in the past. Swallow evaluation showed esophageal dysphagia with recommendation of thin liquid diet as well as oral and esophageal candidiasis. Patient was transferred to [facility] for GI evaluation of dysphagia.       Overview/Hospital Course:  12/9: admitted for LLL PNA on IV Rocephin, azithromycin, COPD exacerbation on Solumedrol, Duonebs, budesonide, esophageal dysphagia pending GI consult.  12/10: continue IV ATX, breathing treatments, speech eval pending and potential barium UGI PRN after speech consult.  12/11: BP elevated. Losartan elissa doubled. Speech language eval.       Interval History: Pt examined at bedside this am. She is resting comfortably in bed, in no acute distress, watching television. She states that she is still having difficulty swallowing. Otherwise, she feels fine.     Swallow eval is pending. GI following.     Bp was significantly elevated overnight. Losartan dose doubled to 50mg.      Review of Systems   Constitutional:  Negative for chills, fatigue and fever.   HENT:  Positive for trouble swallowing. Negative for sore throat.    Eyes:  Negative for visual disturbance.   Respiratory:   Negative for cough and shortness of breath.    Cardiovascular:  Negative for chest pain, palpitations and leg swelling.   Gastrointestinal:  Negative for abdominal pain, constipation, diarrhea, nausea, rectal pain and vomiting.   Genitourinary:  Negative for dysuria and hematuria.   Musculoskeletal:  Negative for arthralgias and myalgias.   Skin:  Negative for rash.   Neurological:  Negative for dizziness, light-headedness, numbness and headaches.   Objective:     Vital Signs (Most Recent):  Temp: 96.3 °F (35.7 °C) (12/11/22 0800)  Pulse: 60 (12/11/22 0800)  Resp: 18 (12/11/22 0800)  BP: (!) 182/76 (12/11/22 0800)  SpO2: (!) 93 % (12/11/22 0800)   Vital Signs (24h Range):  Temp:  [96.2 °F (35.7 °C)-97.5 °F (36.4 °C)] 96.3 °F (35.7 °C)  Pulse:  [57-72] 60  Resp:  [18-19] 18  SpO2:  [90 %-98 %] 93 %  BP: (152-182)/(53-86) 182/76     Weight: 37.9 kg (83 lb 8 oz)  There is no height or weight on file to calculate BMI.    Intake/Output Summary (Last 24 hours) at 12/11/2022 1133  Last data filed at 12/11/2022 0023  Gross per 24 hour   Intake --   Output 0 ml   Net 0 ml      Physical Exam  Vitals reviewed.   Constitutional:       General: She is not in acute distress.     Comments: Chronically ill appearing, significantly malnourished   HENT:      Head: Normocephalic and atraumatic.   Eyes:      General: No scleral icterus.     Extraocular Movements: Extraocular movements intact.      Pupils: Pupils are equal, round, and reactive to light.   Cardiovascular:      Rate and Rhythm: Normal rate and regular rhythm.      Heart sounds: Murmur heard.   Pulmonary:      Effort: Pulmonary effort is normal. No respiratory distress.      Breath sounds: Wheezing and rhonchi present.   Abdominal:      General: Abdomen is flat. Bowel sounds are normal. There is no distension.      Palpations: Abdomen is soft. There is no mass.      Tenderness: There is no abdominal tenderness.   Musculoskeletal:      Cervical back: Normal range of motion  and neck supple.      Right lower leg: No edema.      Left lower leg: No edema.   Skin:     General: Skin is warm and dry.      Findings: Bruising present. No rash.   Neurological:      General: No focal deficit present.      Mental Status: She is alert and oriented to person, place, and time.   Psychiatric:         Mood and Affect: Mood normal.         Behavior: Behavior normal.       Significant Labs: All pertinent labs within the past 24 hours have been reviewed.    Significant Imaging: I have reviewed all pertinent imaging results/findings within the past 24 hours.      Assessment/Plan:      * Pneumonia involving left lung  Presented to OSH with shortness of breath and hypoxia, found to have left lower lobe PNA on CXR. Procalcitonin elevated at 0.28, WBC 10 on admission; WBC today of 15.67. Blood cultures with no growth. She was started on IV rocephin and azithromycin for treatment. Given patient's history of COPD/structural lung disease and concern for aspiration PNA.  Continue treatment with zosyn and levaquin   Repeat CXR (12/10) reveals patchy airspace opacities within the lower lobes, probably pneumonia. Small bilateral pleural effusions.      Oral candidiasis  Candidiasis still noted but appears to be improved. Will continue fluconazole.      Severe malnutrition  Poor PO intake with history of esophageal strictures and dysphagia. GI to evaluate, may need PEG placement. Thin liquid diet currently and Ensure with meals.      Hypertension  Losartan dose doubled due to significant hypertension     Esophageal dysphagia  Has history of esophageal strictures requiring dilation x3 in the past. Worsened dysphagia at present with poor PO intake. Swallow evaluation at OSH recommend thin liquid diet for now. Will consult GI to evaluate need for repeat esophageal dilation vs possible PEG placement, appreciate assistance.   Swallow eval pending  Will consider GI barium swallow after speech evaluation.  Reconsult GI if  acute changes in condition      COPD exacerbation  Longstanding history of smoking, on no inhalers at home  Wheezing and rhonchi on exam; CXR   Started on steroids at OSH, currently solumedrol 40mg q12; will wean as tolerated  Levaquin for LLL PNA as above  Duonebs q4h, budesonide q12      VTE Risk Mitigation (From admission, onward)         Ordered     heparin (porcine) injection 5,000 Units  Every 12 hours         12/09/22 2123     IP VTE HIGH RISK PATIENT  Once         12/09/22 2123     Place sequential compression device  Until discontinued         12/09/22 2123                Discharge Planning   GASTON:      Code Status: Full Code   Is the patient medically ready for discharge?:     Reason for patient still in hospital (select all that apply): Treatment                     Joe Leon DO  Department of Hospital Medicine   [facility]

## 2022-12-11 NOTE — PT/OT/SLP PROGRESS
Occupational Therapy   Treatment    Name: Olga Orellana  MRN: [id number]  Admitting Diagnosis:  Pneumonia involving left lung       Recommendations:     Discharge Recommendations: nursing facility, skilled, rehabilitation facility  Discharge Equipment Recommendations:   (to be determined)  Barriers to discharge:  None    Assessment:     Olga Orellana is a 82 y.o. female with a medical diagnosis of Pneumonia involving left lung.  She presents with no complaint.Pt agreed to OT treatment. Performance deficits affecting function are weakness, impaired endurance.     Rehab Prognosis:  Good; patient would benefit from acute skilled OT services to address these deficits and reach maximum level of function.       Plan:     Patient to be seen 5 x/week to address the above listed problems via self-care/home management, therapeutic activities, therapeutic exercises  Plan of Care Expires:    Plan of Care Reviewed with: patient    Subjective     Pain/Comfort:  Pain Rating 1: 0/10  Pain Rating Post-Intervention 1: 0/10    Objective:     Communicated with: ELOY Monet prior to session.  Patient found HOB elevated with oxygen, peripheral IV, telemetry upon OT entry to room.    General Precautions: Standard, fall    Orthopedic Precautions:N/A  Braces: N/A  Respiratory Status: Nasal cannula, flow 2 L/min     Occupational Performance:     Bed Mobility:         Functional Mobility/Transfers:    Functional Mobility:     Activities of Daily Living:        [facility] 6 Click ADL:      Treatment & Education:  Pt performed UE strengthening exercises. 1 lb hand wt with CGA (B) shoulder flexion/extension and adduction/abduction. 2 lb hand wt with CGA x 2 sets of 15 reps (B) elbow and wrist flexion/extension. Red theraband x 2 sets of 15 reps (B) elbow extension. Hand exerciser x 2 sets of 20 reps (B).  Pt participated well with tx. Rest breaks provided as needed    Patient left HOB elevated with all lines intact and call button in  reach    GOALS:   Multidisciplinary Problems       Occupational Therapy Goals          Problem: Occupational Therapy    Goal Priority Disciplines Outcome Interventions   Occupational Therapy Goal     OT, PT/OT Ongoing, Progressing    Description: STG:  Pt will perform grooming with setup  Pt will bathe with setup  Pt will perform UE dressing with (I)  Pt will perform LE dressing with (I)/Mod I  Pt will sit EOB x 10 min (I)  Pt will transfer bed/chair/bsc with CGA  Pt will perform standing task x 2 min with CGA   Pt will tolerate 20 minutes of tx without fatigue      LT.Restore to max I with self care and mobility.                          Time Tracking:     OT Date of Treatment: 22  OT Start Time: 1510  OT Stop Time: 1528  OT Total Time (min): 18 min    Billable Minutes:Therapeutic Exercise 15    OT/NEY: OT          2022

## 2022-12-11 NOTE — PT/OT/SLP EVAL
"Speech Language Pathology Evaluation  Bedside Swallow    Patient Name:  Olga Orellana   MRN:  [id number]  Admitting Diagnosis: Pneumonia involving left lung    Recommendations:                 General Recommendations:  GI evaluation and Follow-up not indicated  Diet recommendations:  Other (Comment) (Rec full liquid diet and GI consult to further assess.), Full liquids   Aspiration Precautions:  liquid diet as tolerated until GI evaluation, HOB to 90 degrees, and Standard aspiration precautions   General Precautions: Standard,    Communication strategies:   Patient somewhat confused    History:     Past Medical History:   Diagnosis Date    Adenocarcinoma of colon     Anemia     B12 deficiency     Bladder cancer     Chronic anemia 12/10/2022    Chronic gastritis     COPD (chronic obstructive pulmonary disease)     Coronary artery disease     HLD (hyperlipidemia)     Hypertension     Osteoporosis     Paroxysmal SVT (supraventricular tachycardia)     Peripheral vascular disease        Past Surgical History:   Procedure Laterality Date    APPENDECTOMY      CATARACT EXTRACTION      COLONOSCOPY  03/16/2020    ENDARTERECTOMY OF FEMORAL ARTERY  10/27/2020    ESOPHAGOGASTRODUODENOSCOPY  03/16/2020    HYSTERECTOMY      ILIAC ARTERY STENT  08/2009    LAPAROSCOPIC PARTIAL COLECTOMY         Social History: Patient lives with her daughter (per patient).    Prior Intubation HX:  n/a    Modified Barium Swallow: n/a    Chest X-Rays: see chart    Prior diet: unknown.    Occupation/hobbies/homemaking: not stated.    Subjective     Patient lying in bed awake with breakfast tray in front of her. Patient reported she can't eat that because it "backs up." However, she wants it left there. Patient agreeable to try BEDSIDE SWALLOW EVALUATION.  Patient goals: not stated     Pain/Comfort:  Pain Rating 1: 0/10    Respiratory Status: Nasal cannula, flow 2 L/min    Objective:     Oral Musculature Evaluation  Dentition: upper and lower " "dentures  Secretion Management: other (see comments) (Pt reports her secretions and food begin to "build up" and she has to eventually spit them out.)  Mucosal Quality: dry  Oral Labial Strength and Mobility: WFL  Lingual Strength and Mobility: WFL    Bedside Swallow Eval:   Consistencies Assessed:  Thin liquids Patient was given 3 small trials of water via a spoon without any difficulty. No overt s/s of aspiration noted. Patient was then given 3 small trials of water via a straw. No difficulty noted; however, patient refused any further trials after that, saying if she took too much, it would start to come back up. Patient had a cup on her bedside table that she had been spitting into when liquids or secretions would come back up. Patient also refused any other consistencies.       Oral Phase:   Poor oral acceptance    Pharyngeal Phase:   Patient does report regurgitation of foods/liquids if she eats/drinks very much  no overt clinical signs/symptoms of aspiration  no overt clinical signs/symptoms of pharyngeal dysphagia    Compensatory Strategies  None    Treatment: Rec continue full liquid diet as tolerated and refer to GI for barium swallow to further assess. Reconsult SPEECH THERAPY if needed. Skilled SPEECH THERAPY not indicated at this time.     Assessment:     Olga Orellana is a 82 y.o. female with an SLP diagnosis of Dysphagia.  She presents with food and liquids coming back up after she swallows. She has also had 3 esophageal dilatations in the past.    Goals:   Multidisciplinary Problems       SLP Goals       Not on file                    Plan:     Patient to be seen:      Plan of Care expires:     Plan of Care reviewed with:      SLP Follow-Up:  No       Discharge recommendations:      Barriers to Discharge:  Decreased Care Giver Support    Time Tracking:     SLP Treatment Date:      Speech Start Time:  0905  Speech Stop Time:  0935     Speech Total Time (min):  30 min    Billable Minutes: Eval Swallow " and Oral Function 30    12/11/2022

## 2022-12-11 NOTE — SUBJECTIVE & OBJECTIVE
Interval History: Pt examined at bedside this am. She is resting comfortably in bed, in no acute distress, watching television. She states that she is still having difficulty swallowing. Otherwise, she feels fine.     Swallow eval is pending. GI following.     Bp was significantly elevated overnight. Losartan dose doubled to 50mg.      Review of Systems   Constitutional:  Negative for chills, fatigue and fever.   HENT:  Positive for trouble swallowing. Negative for sore throat.    Eyes:  Negative for visual disturbance.   Respiratory:  Negative for cough and shortness of breath.    Cardiovascular:  Negative for chest pain, palpitations and leg swelling.   Gastrointestinal:  Negative for abdominal pain, constipation, diarrhea, nausea, rectal pain and vomiting.   Genitourinary:  Negative for dysuria and hematuria.   Musculoskeletal:  Negative for arthralgias and myalgias.   Skin:  Negative for rash.   Neurological:  Negative for dizziness, light-headedness, numbness and headaches.   Objective:     Vital Signs (Most Recent):  Temp: 96.3 °F (35.7 °C) (12/11/22 0800)  Pulse: 60 (12/11/22 0800)  Resp: 18 (12/11/22 0800)  BP: (!) 182/76 (12/11/22 0800)  SpO2: (!) 93 % (12/11/22 0800)   Vital Signs (24h Range):  Temp:  [96.2 °F (35.7 °C)-97.5 °F (36.4 °C)] 96.3 °F (35.7 °C)  Pulse:  [57-72] 60  Resp:  [18-19] 18  SpO2:  [90 %-98 %] 93 %  BP: (152-182)/(53-86) 182/76     Weight: 37.9 kg (83 lb 8 oz)  There is no height or weight on file to calculate BMI.    Intake/Output Summary (Last 24 hours) at 12/11/2022 1133  Last data filed at 12/11/2022 0023  Gross per 24 hour   Intake --   Output 0 ml   Net 0 ml      Physical Exam  Vitals reviewed.   Constitutional:       General: She is not in acute distress.     Comments: Chronically ill appearing, significantly malnourished   HENT:      Head: Normocephalic and atraumatic.   Eyes:      General: No scleral icterus.     Extraocular Movements: Extraocular movements intact.       Pupils: Pupils are equal, round, and reactive to light.   Cardiovascular:      Rate and Rhythm: Normal rate and regular rhythm.      Heart sounds: Murmur heard.   Pulmonary:      Effort: Pulmonary effort is normal. No respiratory distress.      Breath sounds: Wheezing and rhonchi present.   Abdominal:      General: Abdomen is flat. Bowel sounds are normal. There is no distension.      Palpations: Abdomen is soft. There is no mass.      Tenderness: There is no abdominal tenderness.   Musculoskeletal:      Cervical back: Normal range of motion and neck supple.      Right lower leg: No edema.      Left lower leg: No edema.   Skin:     General: Skin is warm and dry.      Findings: Bruising present. No rash.   Neurological:      General: No focal deficit present.      Mental Status: She is alert and oriented to person, place, and time.   Psychiatric:         Mood and Affect: Mood normal.         Behavior: Behavior normal.       Significant Labs: All pertinent labs within the past 24 hours have been reviewed.    Significant Imaging: I have reviewed all pertinent imaging results/findings within the past 24 hours.

## 2022-12-11 NOTE — NURSING
Pt is now being returned to her room by GI staff, Bisi.  She informed that the procedure was not started r/t inability to get patient admitted per admit dept.  No further info given.  No status changes noted since initial assess this shift.

## 2022-12-11 NOTE — ASSESSMENT & PLAN NOTE
Longstanding history of smoking, on no inhalers at home  Wheezing and rhonchi on exam; CXR   Started on steroids at OSH, currently solumedrol 40mg q12; will wean as tolerated  Levaquin for LLL PNA as above  Duonebs q4h, budesonide q12

## 2022-12-11 NOTE — PLAN OF CARE
Problem: Adult Inpatient Plan of Care  Goal: Plan of Care Review  12/11/2022 0542 by Camille Willis RN  Outcome: Ongoing, Progressing  12/11/2022 0542 by Camille Willis RN  Outcome: Ongoing, Progressing  Goal: Patient-Specific Goal (Individualized)  12/11/2022 0542 by Camille Willis RN  Outcome: Ongoing, Progressing  12/11/2022 0542 by Camille Willis RN  Outcome: Ongoing, Progressing  Goal: Absence of Hospital-Acquired Illness or Injury  12/11/2022 0542 by Camille Willis RN  Outcome: Ongoing, Progressing  12/11/2022 0542 by Camille Willis RN  Outcome: Ongoing, Progressing  Goal: Optimal Comfort and Wellbeing  12/11/2022 0542 by Camille Willis RN  Outcome: Ongoing, Progressing  12/11/2022 0542 by Camille Willis RN  Outcome: Ongoing, Progressing  Goal: Readiness for Transition of Care  12/11/2022 0542 by Camille Willis RN  Outcome: Ongoing, Progressing  12/11/2022 0542 by Camille Willis RN  Outcome: Ongoing, Progressing     Problem: Fluid Imbalance (Pneumonia)  Goal: Fluid Balance  12/11/2022 0542 by Camille Willis RN  Outcome: Ongoing, Progressing  12/11/2022 0542 by Camille Willis RN  Outcome: Ongoing, Progressing     Problem: Infection (Pneumonia)  Goal: Resolution of Infection Signs and Symptoms  12/11/2022 0542 by Camille Willis RN  Outcome: Ongoing, Progressing  12/11/2022 0542 by Camille Willis RN  Outcome: Ongoing, Progressing     Problem: Respiratory Compromise (Pneumonia)  Goal: Effective Oxygenation and Ventilation  12/11/2022 0542 by Camille Willis RN  Outcome: Ongoing, Progressing  12/11/2022 0542 by Camille Willis RN  Outcome: Ongoing, Progressing     Problem: Skin Injury Risk Increased  Goal: Skin Health and Integrity  12/11/2022 0542 by Camille Willis RN  Outcome: Ongoing, Progressing  12/11/2022 0542 by Camille Willis RN  Outcome: Ongoing, Progressing

## 2022-12-11 NOTE — ASSESSMENT & PLAN NOTE
- Longstanding history of smoking, on no inhalers at home  - Wheezing and rhonchi on exam; CXR   - Started on steroids at OSH, currently solumedrol 40mg q12; will wean as tolerated  - Levaquin for LLL PNA as above  - Duonebs q4h, budesonide q12

## 2022-12-11 NOTE — NURSING
Spoke with s/o Khanh Matos (daughter) to inform her of the procedure scheduled for this AM of upper GI with dilitation.  Patient to sign consent.  Transported via stretcher by Dr. Kaba.

## 2022-12-12 NOTE — ASSESSMENT & PLAN NOTE
Presented to OSH with shortness of breath and hypoxia, found to have left lower lobe PNA on CXR. Procalcitonin elevated at 0.28, WBC 10 on admission; WBC today of 14.91. Blood cultures with no growth (12/13). Given patient's history of COPD/structural lung disease and concern for aspiration PNA:  Continue treatment with zosyn and stopped levaquin. Added Vancomycin on 12/12 and continue.   Ddimer elevated 2.43.  CTA chest (12/13) - no evidence to suggest pulmonary embolism. Pneumonia versus pulmonary edema. Large bilateral pleural effusions. Plugging of the bronchus intermedius and lower lobe bronchi, findings which can be seen in aspiration.  US LE bilateral (12/13) - No evidence to suggest deep venous thrombosis.   Placed on nonrebreather, O2 sat improved.   Well's score for PE 1.5 points (low risk; 1.3% chance of PE in an ED population)  Pulmonology consulted for plugging of the bronchus intermedius and lower lobe bronchi (consistent with aspiration) on CTA; will determine if patient is candidate for bronchoscopy. US guided thoracentesis ordered. Patient escalated back to non-rebreather.

## 2022-12-12 NOTE — PROGRESS NOTES
[facility] Medicine  Progress Note    Patient Name: Olga Orellana  MRN: [id number]  Patient Class: IP- Inpatient   Admission Date: 12/9/2022  Length of Stay: 3 days  Attending Physician: Hayden Rao Jr., MD  Primary Care Provider: Primary Doctor No        Subjective:     Principal Problem:Pneumonia involving left lung        HPI:  81yo F with PMH esophageal strictures with dilation, dysphagia, COPD, HTN presents as transfer from [facility].     Patient initially presented with shortness of breath and found to have left lower lobe pneumonia. She was started on IV antibiotics with rocephin and azithromycin as well as solumedrol for COPD exacerbation. Patient also with poor PO intake and weight loss. She has required three esophageal dilations in the past. Swallow evaluation showed esophageal dysphagia with recommendation of thin liquid diet as well as oral and esophageal candidiasis. Patient was transferred to [facility] for GI evaluation of dysphagia.       Overview/Hospital Course:  12/9: admitted for LLL PNA on IV Rocephin, azithromycin, COPD exacerbation on Solumedrol, Duonebs, budesonide, esophageal dysphagia pending GI consult.  12/10: continue IV ATX, breathing treatments, speech eval pending and potential barium UGI PRN after speech consult.  12/11: BP elevated. Losartan leissa doubled. Speech language eval.   12/12:  systolic O/N, improved after Hydralazine 10mg x1. Monitor BP. Patient agitated and combative in the AM, O2 sat dropped to 85%. Respiratory called, placed on nonrebreather, improved to 98%. ABG, repeat CXR Perihilar and lower lobe airspace opacities have slightly increased from comparison. Bilateral pleural effusions, similar.      Interval History:  systolic O/N, improved after Hydralazine 10mg x1. Patient agitated and combative in the AM, O2 sat dropped to 85%. Respiratory called, placed on nonrebreather, improved to 98%.  ABG 7.45, 29, 70, 20.2, repeat CXR Perihilar and lower lobe airspace opacities have slightly increased from comparison. Bilateral pleural effusions, similar. Monitor BP and O2.    Swallow eval recommends full liquid diet and GI consult. Barium UGI pending.      Review of Systems   Constitutional:  Negative for chills, fatigue and fever.   HENT:  Positive for trouble swallowing. Negative for sore throat.    Eyes:  Negative for visual disturbance.   Respiratory:  Negative for cough and shortness of breath.    Cardiovascular:  Negative for chest pain, palpitations and leg swelling.   Gastrointestinal:  Negative for abdominal pain, constipation, diarrhea, nausea, rectal pain and vomiting.   Genitourinary:  Negative for dysuria and hematuria.   Musculoskeletal:  Negative for arthralgias and myalgias.   Skin:  Negative for rash.   Neurological:  Negative for dizziness, light-headedness, numbness and headaches.   Objective:     Vital Signs (Most Recent):  Temp: 97 °F (36.1 °C) (12/12/22 1100)  Pulse: 73 (12/12/22 1222)  Resp: 20 (12/12/22 1222)  BP: (!) 142/76 (nurse aware) (12/12/22 1100)  SpO2: 100 % (12/12/22 1230)   Vital Signs (24h Range):  Temp:  [97 °F (36.1 °C)-97.9 °F (36.6 °C)] 97 °F (36.1 °C)  Pulse:  [64-76] 73  Resp:  [18-20] 20  SpO2:  [85 %-100 %] 100 %  BP: (142-199)/() 142/76     Weight: 38.2 kg (84 lb 4.8 oz)  Body mass index is 15.93 kg/m².    Intake/Output Summary (Last 24 hours) at 12/12/2022 1246  Last data filed at 12/12/2022 0627  Gross per 24 hour   Intake 1210 ml   Output --   Net 1210 ml        Physical Exam  Vitals reviewed.   Constitutional:       General: She is not in acute distress.     Comments: Chronically ill appearing, significantly malnourished   HENT:      Head: Normocephalic and atraumatic.   Eyes:      General: No scleral icterus.     Extraocular Movements: Extraocular movements intact.      Pupils: Pupils are equal, round, and reactive to light.   Cardiovascular:      Rate and  Rhythm: Normal rate and regular rhythm.      Heart sounds: Murmur heard.   Pulmonary:      Effort: Pulmonary effort is normal. No respiratory distress.      Breath sounds: Wheezing and rhonchi present.   Abdominal:      General: Abdomen is flat. Bowel sounds are normal. There is no distension.      Palpations: Abdomen is soft. There is no mass.      Tenderness: There is no abdominal tenderness.   Musculoskeletal:      Cervical back: Normal range of motion and neck supple.      Right lower leg: No edema.      Left lower leg: No edema.   Skin:     General: Skin is warm and dry.      Findings: Bruising present. No rash.   Neurological:      General: No focal deficit present.      Mental Status: She is alert and oriented to person, place, and time.   Psychiatric:         Mood and Affect: Mood normal.         Behavior: Behavior normal.       Significant Labs: All pertinent labs within the past 24 hours have been reviewed.    Significant Imaging: I have reviewed all pertinent imaging results/findings within the past 24 hours.      Assessment/Plan:      * Pneumonia involving left lung  Presented to OSH with shortness of breath and hypoxia, found to have left lower lobe PNA on CXR. Procalcitonin elevated at 0.28, WBC 10 on admission; WBC today of 14.91. Blood cultures with no growth (12/11). She was started on IV rocephin and azithromycin for treatment. Given patient's history of COPD/structural lung disease and concern for aspiration PNA.  Continue treatment with zosyn and levaquin   Repeat CXR (12/12) shows Perihilar and lower lobe airspace opacities have slightly increased from comparison. Bilateral pleural effusions, similar.  Placed on nonrebreather, O2 sat improved. ABG 7.45, 29, 70, 20.2.     Chronic anemia  Stable and continue to monitor      Oral candidiasis  Candidiasis still noted but appears to be improved. Will continue fluconazole.      Severe malnutrition  Poor PO intake with history of esophageal strictures  and dysphagia. GI to evaluate, may need PEG placement. Thin liquid diet currently per speech and Ensure with meals.      Hypertension  Hydralazine 10 PRN  Losartan dose doubled due to significant hypertension       Esophageal dysphagia  Has history of esophageal strictures requiring dilation x3 in the past. Worsened dysphagia at present with poor PO intake. Swallow evaluation at OSH recommend thin liquid diet for now. Will consult GI to evaluate need for repeat esophageal dilation vs possible PEG placement, appreciate assistance.   Swallow eval recommends full liquid diet and GI consult  GI barium UGI pending  Reconsult GI if acute changes in condition or after barium UGI      COPD exacerbation  Longstanding history of smoking, on no inhalers at home  Wheezing and rhonchi on exam; CXR   Started on steroids at OSH, currently solumedrol 40mg q12; will wean as tolerated  Levaquin for LLL PNA as above  Duonebs q4h, budesonide q12    VTE Risk Mitigation (From admission, onward)         Ordered     heparin (porcine) injection 5,000 Units  Every 8 hours         12/12/22 1044     IP VTE HIGH RISK PATIENT  Once         12/09/22 2123     Place sequential compression device  Until discontinued         12/09/22 2123                Discharge Planning   GASTON:      Code Status: Full Code   Is the patient medically ready for discharge?:     Reason for patient still in hospital (select all that apply): Patient unstable, Patient trending condition, Laboratory test, Treatment and Pending disposition  Discharge Plan A: Home with family                  Jeremiah Ivy DO  Department of Hospital Medicine   [facility]

## 2022-12-12 NOTE — NURSING
Notified Dr. Rao regarding patient respiratory status. Patient is now on a venti mask @ 50%. No new orders received at this time. Doctor Rao states he will come see the patient.

## 2022-12-12 NOTE — SUBJECTIVE & OBJECTIVE
Interval History:  systolic O/N, improved after Hydralazine 10mg x1. Patient agitated and combative in the AM, O2 sat dropped to 85%. Respiratory called, placed on nonrebreather, improved to 98%. ABG 7.45, 29, 70, 20.2, repeat CXR Perihilar and lower lobe airspace opacities have slightly increased from comparison. Bilateral pleural effusions, similar. Monitor BP and O2.    Swallow eval recommends full liquid diet and GI consult. Barium UGI pending.      Review of Systems   Constitutional:  Negative for chills, fatigue and fever.   HENT:  Positive for trouble swallowing. Negative for sore throat.    Eyes:  Negative for visual disturbance.   Respiratory:  Negative for cough and shortness of breath.    Cardiovascular:  Negative for chest pain, palpitations and leg swelling.   Gastrointestinal:  Negative for abdominal pain, constipation, diarrhea, nausea, rectal pain and vomiting.   Genitourinary:  Negative for dysuria and hematuria.   Musculoskeletal:  Negative for arthralgias and myalgias.   Skin:  Negative for rash.   Neurological:  Negative for dizziness, light-headedness, numbness and headaches.   Objective:     Vital Signs (Most Recent):  Temp: 97 °F (36.1 °C) (12/12/22 1100)  Pulse: 73 (12/12/22 1222)  Resp: 20 (12/12/22 1222)  BP: (!) 142/76 (nurse aware) (12/12/22 1100)  SpO2: 100 % (12/12/22 1230)   Vital Signs (24h Range):  Temp:  [97 °F (36.1 °C)-97.9 °F (36.6 °C)] 97 °F (36.1 °C)  Pulse:  [64-76] 73  Resp:  [18-20] 20  SpO2:  [85 %-100 %] 100 %  BP: (142-199)/() 142/76     Weight: 38.2 kg (84 lb 4.8 oz)  Body mass index is 15.93 kg/m².    Intake/Output Summary (Last 24 hours) at 12/12/2022 1240  Last data filed at 12/12/2022 0627  Gross per 24 hour   Intake 1210 ml   Output --   Net 1210 ml        Physical Exam  Vitals reviewed.   Constitutional:       General: She is not in acute distress.     Comments: Chronically ill appearing, significantly malnourished   HENT:      Head: Normocephalic and  atraumatic.   Eyes:      General: No scleral icterus.     Extraocular Movements: Extraocular movements intact.      Pupils: Pupils are equal, round, and reactive to light.   Cardiovascular:      Rate and Rhythm: Normal rate and regular rhythm.      Heart sounds: Murmur heard.   Pulmonary:      Effort: Pulmonary effort is normal. No respiratory distress.      Breath sounds: Wheezing and rhonchi present.   Abdominal:      General: Abdomen is flat. Bowel sounds are normal. There is no distension.      Palpations: Abdomen is soft. There is no mass.      Tenderness: There is no abdominal tenderness.   Musculoskeletal:      Cervical back: Normal range of motion and neck supple.      Right lower leg: No edema.      Left lower leg: No edema.   Skin:     General: Skin is warm and dry.      Findings: Bruising present. No rash.   Neurological:      General: No focal deficit present.      Mental Status: She is alert and oriented to person, place, and time.   Psychiatric:         Mood and Affect: Mood normal.         Behavior: Behavior normal.       Significant Labs: All pertinent labs within the past 24 hours have been reviewed.    Significant Imaging: I have reviewed all pertinent imaging results/findings within the past 24 hours.

## 2022-12-12 NOTE — ASSESSMENT & PLAN NOTE
Presented to OSH with shortness of breath and hypoxia, found to have left lower lobe PNA on CXR. Procalcitonin elevated at 0.28, WBC 10 on admission; WBC today of 14.91. Blood cultures with no growth (12/11). She was started on IV rocephin and azithromycin for treatment. Given patient's history of COPD/structural lung disease and concern for aspiration PNA.  Continue treatment with zosyn and levaquin   Repeat CXR (12/12) shows Perihilar and lower lobe airspace opacities have slightly increased from comparison. Bilateral pleural effusions, similar.  Placed on nonrebreather, O2 sat improved. ABG 7.45, 29, 70, 20.2.

## 2022-12-12 NOTE — PROGRESS NOTES
Wound care note;  Patient skin was evaluated today  Patient in bed. Alert. Has Venti mask on. Daughter at bedside.   Sacral with pink discolored skin, Slow blanching noted. Applied Aquacel Foam border   Bilateral heels with out pressure injury noted.   Bilateral arms with dry old purple bruising noted   Has skin tear to Left posterior hand, red slow granular tissue. Re applied Aquacel Foam border .  Incontinent of bowel and bladder at times.  Refused have picture of Left hand.   Periods of confusion noted.   Potential for skin breakdown due to age, limited mobility, fragile skin, bony prominence   Cont turn protocol  Waffle boots ton heels   On low air loss mattress   Wound care team to follow.

## 2022-12-12 NOTE — PROGRESS NOTES
Pharmacist Renal Dose Adjustment Note    Olga Orellana is a 82 y.o. female being treated with the medication levofloxacin    Patient Data:    Vital Signs (Most Recent):  Temp: 97 °F (36.1 °C) (12/12/22 1100)  Pulse: 73 (12/12/22 1222)  Resp: 20 (12/12/22 1222)  BP: (!) 142/76 (nurse aware) (12/12/22 1100)  SpO2: 100 % (12/12/22 1230)   Vital Signs (72h Range):  Temp:  [96.2 °F (35.7 °C)-98.7 °F (37.1 °C)]   Pulse:  [57-94]   Resp:  [18-20]   BP: (142-200)/()   SpO2:  [85 %-100 %]      Recent Labs   Lab 12/10/22  0212 12/11/22  0244 12/12/22  0713   CREATININE 0.66 0.93 0.88     Serum creatinine: 0.88 mg/dL 12/12/22 0713  Estimated creatinine clearance: 30.2 mL/min    Medication:levofloxacin dose: 750 mg frequency daily will be changed to medication:levofloxacin dose:750 mg frequency:q48h    Pharmacist's Name: Nirali Arshad  Pharmacist's Extension: [phone]

## 2022-12-12 NOTE — PT/OT/SLP PROGRESS
Physical Therapy      Patient Name:  Olga Orellana   MRN:  [id number]    Patient not seen today secondary to Therapist assessment. pt with increased confusion, now on non rebreather mask and with increased BP. Will follow-up next treatment date.    PT POC discussed with Brenda Son DPT

## 2022-12-12 NOTE — ASSESSMENT & PLAN NOTE
Poor PO intake with history of esophageal strictures and dysphagia. GI to evaluate, may need PEG placement. Thin liquid diet currently per speech and Ensure with meals.

## 2022-12-12 NOTE — PLAN OF CARE
[facility]  Initial Discharge Assessment       Primary Care Provider: Primary Doctor No    Admission Diagnosis: Pneumonia [J18.9]    Admission Date: 12/9/2022  Expected Discharge Date:     Discharge Barriers Identified: None    Payor: /     Extended Emergency Contact Information  Primary Emergency Contact: Khanh Matos  Mobile Phone: [phone]  Relation: Daughter  Preferred language: English   needed? No    Discharge Plan A: Home with family  Discharge Plan B: Home with family    No Pharmacies Listed    Initial Assessment (most recent)       Adult Discharge Assessment - 12/12/22 1019          Discharge Assessment    Assessment Type Discharge Planning Assessment     Confirmed/corrected address, phone number and insurance Yes     Confirmed Demographics Correct on Facesheet     Source of Information patient;family     If unable to respond/provide information was family/caregiver contacted? Yes     Contact Name/Number Lexie Matos- daughter     Communicated GASTON with patient/caregiver Date not available/Unable to determine     Facility Arrived From: [facility]     Do you expect to return to your current living situation? Yes     Do you have help at home or someone to help you manage your care at home? Yes     Who are your caregiver(s) and their phone number(s)? Eva Matos-daughter     Prior to hospitilization cognitive status: Alert/Oriented     Current cognitive status: Unable to Assess     Walking or Climbing Stairs ambulation difficulty, requires equipment   wheelchair    Mobility Management independent up until last week     Dressing/Bathing bathing difficulty, requires equipment     Home Accessibility wheelchair accessible   ramp to get into home    Home Layout Able to live on 1st floor     Equipment Currently Used at Home --   pt has no dme, pt's family has some dme if needed.    Readmission within 30 days? No     Patient currently being  followed by outpatient case management? No     Do you currently have service(s) that help you manage your care at home? No     Do you take prescription medications? Yes     Do you have prescription coverage? Yes     Coverage medicare/ medicaid     Do you have any problems affording any of your prescribed medications? No     Is the patient taking medications as prescribed? yes     Who is going to help you get home at discharge? daughter     How do you get to doctors appointments? family or friend will provide     Are you on dialysis? No     Do you take coumadin? No     Discharge Plan A Home with family     Discharge Plan B Home with family     DME Needed Upon Discharge  none     Discharge Plan discussed with: Adult children     Discharge Barriers Identified None        Physical Activity    On average, how many days per week do you engage in moderate to strenuous exercise (like a brisk walk)? 0 days     On average, how many minutes do you engage in exercise at this level? 0 min        Financial Resource Strain    How hard is it for you to pay for the very basics like food, housing, medical care, and heating? Not hard at all        Housing Stability    In the last 12 months, was there a time when you were not able to pay the mortgage or rent on time? No     In the last 12 months, how many places have you lived? 1     In the last 12 months, was there a time when you did not have a steady place to sleep or slept in a shelter (including now)? No        Transportation Needs    In the past 12 months, has lack of transportation kept you from medical appointments or from getting medications? No     In the past 12 months, has lack of transportation kept you from meetings, work, or from getting things needed for daily living? No        Food Insecurity    Within the past 12 months, you worried that your food would run out before you got the money to buy more. Never true     Within the past 12 months, the food you bought just  didn't last and you didn't have money to get more. Never true        Stress    Do you feel stress - tense, restless, nervous, or anxious, or unable to sleep at night because your mind is troubled all the time - these days? Rather much        Social Connections    In a typical week, how many times do you talk on the phone with family, friends, or neighbors? More than three times a week     How often do you get together with friends or relatives? More than three times a week     How often do you attend Jehovah's witness or Jewish services? Never     Do you belong to any clubs or organizations such as Jehovah's witness groups, unions, fraternal or athletic groups, or school groups? No     How often do you attend meetings of the clubs or organizations you belong to? Never     Are you , , , , never , or living with a partner?         Alcohol Use    Q1: How often do you have a drink containing alcohol? Never     Q2: How many drinks containing alcohol do you have on a typical day when you are drinking? Patient does not drink     Q3: How often do you have six or more drinks on one occasion? Never                   SS spoke with pt and her daughter in room. Pt on facemask with oxygen and difficult to understand. Pt was living at home alone up until a week ago and has been at home with her daughter. Pt's daughter has dme needed for pt. Password set and is demetrice. Informed about IDTM and will attend. Not current with home health. SS will follow for discharge need.s

## 2022-12-12 NOTE — ASSESSMENT & PLAN NOTE
Has history of esophageal strictures requiring dilation x3 in the past. Worsened dysphagia at present with poor PO intake. Swallow evaluation at OSH recommend thin liquid diet for now. Will consult GI to evaluate need for repeat esophageal dilation vs possible PEG placement, appreciate assistance.   Swallow eval recommends full liquid diet and GI consult  GI barium UGI pending  Reconsult GI if acute changes in condition or after barium UGI

## 2022-12-13 NOTE — PROGRESS NOTES
Pharmacy consulted for vancomycin dosing. We will begin vancomycin 750 mg IV every 36 hours 12/12/22 2000 and check a trough 12/15/22 1930. Pharmacy will monitor daily and adjust as necessary.

## 2022-12-13 NOTE — SUBJECTIVE & OBJECTIVE
Interval History:  Patient less altered today on exam, states that she is cold. She is resting in bed comfortably. Patient is saturating in the 94% with 35% FiO2, 9L on facemask.     Patient has significant bruising; D/C heparin and have TEDs and SCDS for DVT prophlaxis. LE US bilateral demonstrated no DVT. CTA demonstrated no PE, but bilateral pleural effusions. Patient to be given 25mg of 25% albumin and IV lasix 40mg q6hrs for 4 doses. Pulmonology consulted for plugging of the bronchus intermedius and lower lobe bronchi (consistent with aspiration) on CTA; will determine if patient is candidate for bronchoscopy. US guided thoracentesis ordered. Patient escalated back to non-rebreather. PICC team consulted, TPN panel pending. GI consulted and will evaluate patient tomorrow for EGD vs PEG.    Review of Systems   Constitutional:  Negative for chills, fatigue and fever.   HENT:  Positive for trouble swallowing. Negative for sore throat.    Eyes:  Negative for visual disturbance.   Respiratory:  Negative for cough and shortness of breath.    Cardiovascular:  Negative for chest pain, palpitations and leg swelling.   Gastrointestinal:  Negative for abdominal pain, constipation, diarrhea, nausea, rectal pain and vomiting.   Genitourinary:  Negative for dysuria and hematuria.   Musculoskeletal:  Negative for arthralgias and myalgias.   Skin:  Negative for rash.   Neurological:  Negative for dizziness, light-headedness, numbness and headaches.   Objective:     Vital Signs (Most Recent):  Temp: 96.9 °F (36.1 °C) (12/13/22 0748)  Pulse: (!) 54 (12/13/22 1223)  Resp: 16 (12/13/22 1223)  BP: (!) 179/65 (12/13/22 0931)  SpO2: 95 % (12/13/22 1223)   Vital Signs (24h Range):  Temp:  [96.9 °F (36.1 °C)-100.6 °F (38.1 °C)] 96.9 °F (36.1 °C)  Pulse:  [54-79] 54  Resp:  [16-20] 16  SpO2:  [93 %-100 %] 95 %  BP: (113-179)/(49-78) 179/65     Weight: 39.1 kg (86 lb 1.6 oz)  Body mass index is 16.27 kg/m².  No intake or output data in  the 24 hours ending 12/13/22 1232   Physical Exam  Vitals reviewed.   Constitutional:       General: She is not in acute distress.     Comments: Chronically ill appearing, significantly malnourished   HENT:      Head: Normocephalic and atraumatic.      Right Ear: External ear normal.      Left Ear: External ear normal.      Nose: Nose normal.   Eyes:      General: No scleral icterus.     Extraocular Movements: Extraocular movements intact.      Pupils: Pupils are equal, round, and reactive to light.   Cardiovascular:      Rate and Rhythm: Normal rate and regular rhythm.      Heart sounds: Murmur heard.   Pulmonary:      Effort: Pulmonary effort is normal. No respiratory distress.      Breath sounds: Wheezing and rhonchi present.   Abdominal:      General: Abdomen is flat. Bowel sounds are normal. There is no distension.      Palpations: Abdomen is soft. There is no mass.      Tenderness: There is no abdominal tenderness.   Musculoskeletal:      Cervical back: Normal range of motion and neck supple.      Right lower leg: No edema.      Left lower leg: No edema.   Skin:     General: Skin is warm and dry.      Findings: Bruising present. No rash.   Neurological:      General: No focal deficit present.      Mental Status: She is alert and oriented to person, place, and time.   Psychiatric:         Mood and Affect: Mood normal.         Behavior: Behavior normal.       Significant Labs: All pertinent labs within the past 24 hours have been reviewed.    Significant Imaging: I have reviewed all pertinent imaging results/findings within the past 24 hours.

## 2022-12-13 NOTE — PLAN OF CARE
Problem: Occupational Therapy  Goal: Occupational Therapy Goal  Description: STG:  Pt will perform grooming with setup  Pt will bathe with setup  Pt will perform UE dressing with (I)  Pt will perform LE dressing with (I)/Mod I  Pt will sit EOB x 10 min (I)  Pt will transfer bed/chair/bsc with CGA  Pt will perform standing task x 2 min with CGA   Pt will tolerate 20 minutes of tx without fatigue      LT.Restore to max I with self care and mobility.     Outcome: Ongoing, Progressing   Upper Body Dressing: moderate assistance donning gown as a robe per eval  Pt recently evaled on 12/10/2022. Plan si to cont with OT poc

## 2022-12-13 NOTE — HPI
12/13/22-Patients chart is reviewed. Note she was admitted originally on 12/9 with shortness of breath and findings of pneumonia.  She has a history of COPD as well.  Patient reportedly has had some ongoing weight loss and decreased oral intake as well.  Speech therapy did evaluate her with recommendations for thin liquid diet as well as findings of Candida.  According to her chart review she had reportedly has had prior esophageal dilations as well however there are no records in our facility database supporting this here.  Patient's family reports that she has had some ongoing dysphagia and decreased oral intake therefore she was evaluated by Dr. Sexton on 12/10 however no endoscopy was performed at that time.  GI has now been reconsulted to evaluate for possible EGD versus possible PEG placement.  Patient is seen resting in bed.  She will attempt to answer questions however uncertain as to her mental status at this time due to no family available.  It is noted on chart review that patient's daughter states she is not normally confused and was noted to be sitting more so this morning.  She does state that she just does not want to eat and she seems indicate it does not hurt when she does eat.  She does continue at this time on O2 via nonrebreather.  Noted on yesterday she did have a upper GI done however patient was unable to tolerate drinking the barium therefore no fluoroscopy exam was performed.    12/14/2022-patient is seen, resting in bed.  Patient is arousable but does fall back asleep.  Chart is reviewed and note she is for PICC line placement this morning due to poor peripheral access.  Her vital signs reviewed with oxygen saturations around 97% on 60% O2 by facemask.  She continues on IV antibiotics.  Note she did have a thoracentesis done on yesterday with reported 0.75 L of fluid removed.  Her repeat x-ray postthoracentesis shows an increasing RLL density possibly indicating worsening pneumonia.   Abdomen soft, nontender.

## 2022-12-13 NOTE — ASSESSMENT & PLAN NOTE
Has history of esophageal strictures requiring dilation x3 in the past. Worsened dysphagia at present with poor PO intake. Swallow evaluation at OSH recommend thin liquid diet for now.   Swallow eval recommends full liquid diet and GI consult  GI barium UGI (12/12) - Patient unable to drink barium solution. No fluoroscopic exam performed.  Consulted GI to evaluate need for repeat esophageal dilation vs possible PEG placement, appreciate assistance.   GI will see patient tomorrow 12/14  PICC team consulted, TPN panel pending.

## 2022-12-13 NOTE — SUBJECTIVE & OBJECTIVE
Past Medical History:   Diagnosis Date    Adenocarcinoma of colon     Anemia     B12 deficiency     Bladder cancer     Chronic anemia 12/10/2022    Chronic gastritis     COPD (chronic obstructive pulmonary disease)     Coronary artery disease     HLD (hyperlipidemia)     Hypertension     Osteoporosis     Paroxysmal SVT (supraventricular tachycardia)     Peripheral vascular disease        Past Surgical History:   Procedure Laterality Date    APPENDECTOMY      CATARACT EXTRACTION      COLONOSCOPY  03/16/2020    ENDARTERECTOMY OF FEMORAL ARTERY  10/27/2020    ESOPHAGOGASTRODUODENOSCOPY  03/16/2020    HYSTERECTOMY      ILIAC ARTERY STENT  08/2009    LAPAROSCOPIC PARTIAL COLECTOMY         Review of patient's allergies indicates:   Allergen Reactions    Iron Other (See Comments)     IV IRON DEXTRAN:  CHEST PAIN  IV IRON DEXTRAN:  CHEST PAIN      Codeine Nausea And Vomiting    Hydrocodone        Family History       Family history is unknown by patient.          Tobacco Use    Smoking status: Every Day     Types: Cigarettes    Smokeless tobacco: Never   Substance and Sexual Activity    Alcohol use: Never    Drug use: Not on file    Sexual activity: Never         Review of Systems   Unable to perform ROS: Acuity of condition   Objective:     Vital Signs (Most Recent):  Temp: 98.6 °F (37 °C) (12/13/22 1200)  Pulse: 63 (12/13/22 1452)  Resp: 20 (12/13/22 1452)  BP: (!) 161/71 (12/13/22 1452)  SpO2: (!) 94 % (12/13/22 1452)   Vital Signs (24h Range):  Temp:  [96.9 °F (36.1 °C)-98.6 °F (37 °C)] 98.6 °F (37 °C)  Pulse:  [54-79] 63  Resp:  [16-20] 20  SpO2:  [93 %-100 %] 94 %  BP: (131-179)/(51-78) 161/71     Weight: 39.1 kg (86 lb 1.6 oz)  Body mass index is 16.27 kg/m².      Intake/Output Summary (Last 24 hours) at 12/13/2022 1637  Last data filed at 12/13/2022 1458  Gross per 24 hour   Intake --   Output 750 ml   Net -750 ml       Physical Exam  Vitals reviewed.   Constitutional:       Appearance: Normal appearance. She is  cachectic. She is ill-appearing.      Interventions: She is not intubated.  HENT:      Head: Normocephalic and atraumatic.      Nose: Nose normal.      Mouth/Throat:      Mouth: Mucous membranes are dry.      Pharynx: Oropharynx is clear.   Eyes:      Extraocular Movements: Extraocular movements intact.      Conjunctiva/sclera: Conjunctivae normal.      Pupils: Pupils are equal, round, and reactive to light.   Cardiovascular:      Rate and Rhythm: Normal rate.      Heart sounds: Normal heart sounds. No murmur heard.  Pulmonary:      Effort: Pulmonary effort is normal. She is not intubated.      Breath sounds: Rhonchi present.   Abdominal:      General: Abdomen is flat. Bowel sounds are normal.      Palpations: Abdomen is soft.   Musculoskeletal:         General: Normal range of motion.      Cervical back: Normal range of motion and neck supple.      Right lower leg: No edema.      Left lower leg: No edema.   Skin:     General: Skin is warm and dry.      Capillary Refill: Capillary refill takes less than 2 seconds.   Neurological:      General: No focal deficit present.      Mental Status: She is oriented to person, place, and time. She is lethargic.   Psychiatric:         Mood and Affect: Mood normal.         Behavior: Behavior normal.       Vents:  Oxygen Concentration (%): 60 (12/13/22 0047)    Lines/Drains/Airways       Peripheral Intravenous Line  Duration                  Peripheral IV - Single Lumen 12/10/22 1810 20 G Anterior;Distal;Left;Lateral Forearm 2 days         Peripheral IV - Single Lumen 12/12/22 2230 Anterior;Left;Proximal Upper Arm <1 day                    Significant Labs:    CBC/Anemia Profile:  Recent Labs   Lab 12/12/22  0713   WBC 14.91*   HGB 10.2*   HCT 31.4*      MCV 94.0   RDW 16.6*        Chemistries:  Recent Labs   Lab 12/12/22  0713      K 3.5   *   CO2 21   BUN 29*   CREATININE 0.88   CALCIUM 7.9*       Recent Lab Results         12/13/22  1305   12/13/22  0012    12/12/22  2202   12/12/22  1644        Influenza B, Molecular   Negative           Appearance, UA   Clear           aPTT 37.1             Bilirubin (UA)   Negative           Color, UA   Light Yellow           COVID-19 Ag   Negative           D-Dimer       2.43       Glucose, UA   Normal           Influenza A   Negative           INR 1.37             Ketones, UA   Negative           Leukocytes, UA   Negative           NITRITE UA   Negative           Occult Blood UA   Negative           pH, UA   5.5           Protein, UA   Negative           Protime 16.3             Specific [place], UA   1.018           TSH     2.720         UROBILINOGEN UA   Normal                   Significant Imaging:   I have reviewed all pertinent imaging results/findings within the past 24 hours.

## 2022-12-13 NOTE — PROGRESS NOTES
Right thoracentesis  Performed by A Fitz A  Consent obtained for thoracentesis.  A formal timeout was called all staff present agreed to patient and procedure.  The right posterior thorax was prepped with ChloraPrep and sterile field was established.  1% lidocaine was used as local anesthetic.  Under ultrasound guidance a 6 Jamaican centesis catheter was placed into the right pleural space.  750 mL yellow pleural fluid was removed.  This was sent for laboratory evaluation.  The catheter was then removed and the puncture site was cleaned and bandaged.  The patient tolerated the procedure well there were no immediate postprocedure complications.  The patient will be transferred to Diagnostic Radiology for post thoracentesis chest x-ray.

## 2022-12-13 NOTE — ASSESSMENT & PLAN NOTE
Longstanding history of smoking, on no inhalers at home  Wheezing and rhonchi on exam. CXR (12/12) Perihilar and lower lobe airspace opacities have slightly increased from comparison. Bilateral pleural effusions. Probable pneumonia from CXR (12/9)  Started on steroids at OSH, currently solumedrol 40mg q12; will wean as tolerated  Levaquin for LLL PNA as above  Duonebs q4h, budesonide q12

## 2022-12-13 NOTE — ASSESSMENT & PLAN NOTE
12/13/2022-chart is reviewed.  Labs reviewed.  Patient continues with poor oral intake.  Upper GI reviewed as well as speech therapy evaluation.  Family requesting possible PEG placement.  Consideration for endoscopy however with fairly new onset confusion as well as ongoing O2 requirements, patient will need to be stable from a neuro/respiratory standpoint prior to undergoing anesthesia/procedure.  We will however review findings further this time with Dr. Fraire, with plan an addendum to follow.

## 2022-12-13 NOTE — CONSULTS
[facility]  Pulmonology  Consult Note    Patient Name: Olga Orellana  MRN: [id number]  Admission Date: 12/9/2022  Hospital Length of Stay: 4 days  Code Status: Full Code  Attending Physician: Hayden Rao Jr., MD  Primary Care Provider: Primary Doctor No   Principal Problem: Pneumonia involving left lung    Consults  Subjective:     HPI:  Patient was initially admitted with shortness of breath found to have flow lower lobe pneumonia started on antibiotics as well as Solu-Medrol for COPD also had poor p.o. intake and weight loss.  Currently she is confused and can not give a lot of history she is on 60% non-rebreather with O2 sats in the 90s her workup shows white blood [place] 32635 hematocrit 31 and a platelet count of a 564189.  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  CT no emboli moderate pleural effusions bilaterally left greater than right no emboli maybe some plugging in the right middle and lower lobe no evidence of a DVT.  750 cc of clear yellow fluid obtained.  She was smoking when she was admitted to the hospital.      Past Medical History:   Diagnosis Date    Adenocarcinoma of colon     Anemia     B12 deficiency     Bladder cancer     Chronic anemia 12/10/2022    Chronic gastritis     COPD (chronic obstructive pulmonary disease)     Coronary artery disease     HLD (hyperlipidemia)     Hypertension     Osteoporosis     Paroxysmal SVT (supraventricular tachycardia)     Peripheral vascular disease        Past Surgical History:   Procedure Laterality Date    APPENDECTOMY      CATARACT EXTRACTION      COLONOSCOPY  03/16/2020    ENDARTERECTOMY OF FEMORAL ARTERY  10/27/2020    ESOPHAGOGASTRODUODENOSCOPY  03/16/2020    HYSTERECTOMY      ILIAC ARTERY STENT  08/2009    LAPAROSCOPIC PARTIAL COLECTOMY         Review of patient's allergies  indicates:   Allergen Reactions    Iron Other (See Comments)     IV IRON DEXTRAN:  CHEST PAIN  IV IRON DEXTRAN:  CHEST PAIN      Codeine Nausea And Vomiting    Hydrocodone        Family History       Family history is unknown by patient.          Tobacco Use    Smoking status: Every Day     Types: Cigarettes    Smokeless tobacco: Never   Substance and Sexual Activity    Alcohol use: Never    Drug use: Not on file    Sexual activity: Never         Review of Systems   Unable to perform ROS: Acuity of condition   Objective:     Vital Signs (Most Recent):  Temp: 98.6 °F (37 °C) (12/13/22 1200)  Pulse: 63 (12/13/22 1452)  Resp: 20 (12/13/22 1452)  BP: (!) 161/71 (12/13/22 1452)  SpO2: (!) 94 % (12/13/22 1452)   Vital Signs (24h Range):  Temp:  [96.9 °F (36.1 °C)-98.6 °F (37 °C)] 98.6 °F (37 °C)  Pulse:  [54-79] 63  Resp:  [16-20] 20  SpO2:  [93 %-100 %] 94 %  BP: (131-179)/(51-78) 161/71     Weight: 39.1 kg (86 lb 1.6 oz)  Body mass index is 16.27 kg/m².      Intake/Output Summary (Last 24 hours) at 12/13/2022 1637  Last data filed at 12/13/2022 1458  Gross per 24 hour   Intake --   Output 750 ml   Net -750 ml       Physical Exam  Vitals reviewed.   Constitutional:       Appearance: Normal appearance. She is cachectic. She is ill-appearing.      Interventions: She is not intubated.  HENT:      Head: Normocephalic and atraumatic.      Nose: Nose normal.      Mouth/Throat:      Mouth: Mucous membranes are dry.      Pharynx: Oropharynx is clear.   Eyes:      Extraocular Movements: Extraocular movements intact.      Conjunctiva/sclera: Conjunctivae normal.      Pupils: Pupils are equal, round, and reactive to light.   Cardiovascular:      Rate and Rhythm: Normal rate.      Heart sounds: Normal heart sounds. No murmur heard.  Pulmonary:      Effort: Pulmonary effort is normal. She is not intubated.      Breath sounds: Rhonchi present.   Abdominal:      General: Abdomen is flat. Bowel sounds are normal.       Palpations: Abdomen is soft.   Musculoskeletal:         General: Normal range of motion.      Cervical back: Normal range of motion and neck supple.      Right lower leg: No edema.      Left lower leg: No edema.   Skin:     General: Skin is warm and dry.      Capillary Refill: Capillary refill takes less than 2 seconds.   Neurological:      General: No focal deficit present.      Mental Status: She is oriented to person, place, and time. She is lethargic.   Psychiatric:         Mood and Affect: Mood normal.         Behavior: Behavior normal.       Vents:  Oxygen Concentration (%): 60 (12/13/22 0047)    Lines/Drains/Airways       Peripheral Intravenous Line  Duration                  Peripheral IV - Single Lumen 12/10/22 1810 20 G Anterior;Distal;Left;Lateral Forearm 2 days         Peripheral IV - Single Lumen 12/12/22 2230 Anterior;Left;Proximal Upper Arm <1 day                    Significant Labs:    CBC/Anemia Profile:  Recent Labs   Lab 12/12/22  0713   WBC 14.91*   HGB 10.2*   HCT 31.4*      MCV 94.0   RDW 16.6*        Chemistries:  Recent Labs   Lab 12/12/22  0713      K 3.5   *   CO2 21   BUN 29*   CREATININE 0.88   CALCIUM 7.9*       Recent Lab Results         12/13/22  1305   12/13/22  0012   12/12/22  2202   12/12/22  1644        Influenza B, Molecular   Negative           Appearance, UA   Clear           aPTT 37.1             Bilirubin (UA)   Negative           Color, UA   Light Yellow           COVID-19 Ag   Negative           D-Dimer       2.43       Glucose, UA   Normal           Influenza A   Negative           INR 1.37             Ketones, UA   Negative           Leukocytes, UA   Negative           NITRITE UA   Negative           Occult Blood UA   Negative           pH, UA   5.5           Protein, UA   Negative           Protime 16.3             Specific [place], UA   1.018           TSH     2.720         UROBILINOGEN UA   Normal                   Significant Imaging:   I have  reviewed all pertinent imaging results/findings within the past 24 hours.    Assessment/Plan:     COPD exacerbation  Patient was admitted to the hospital with difficulty swallowing had dysphagia currently being worked up , your left with issue of COPD which I think is severe she is current requiring a lot of oxygen she may have some retained secretions as well as bilateral effusions I think you have to maximize her COPD component I agree with antibiotics will will look to add something to thin secretions however a bronchoscopy in this situation would be increased risk for possible respiratory failure and I do not think it will be that beneficial.  I would like to know what her cardiac function is and what this fluid on her thoracentesis will be the transudate or exudate          Thank you for your consult. I will sign off. Please contact us if you have any additional questions.     Yuriy Benson MD  Pulmonology  [facility]

## 2022-12-13 NOTE — ASSESSMENT & PLAN NOTE
Hydralazine 10 PRN  Losartan dose doubled due to significant hypertension   Continue to monitor BP

## 2022-12-13 NOTE — ASSESSMENT & PLAN NOTE
Patient was admitted to the hospital with difficulty swallowing had dysphagia currently being worked up , your left with issue of COPD which I think is severe she is current requiring a lot of oxygen she may have some retained secretions as well as bilateral effusions I think you have to maximize her COPD component I agree with antibiotics will will look to add something to thin secretions however a bronchoscopy in this situation would be increased risk for possible respiratory failure and I do not think it will be that beneficial.  I would like to know what her cardiac function is and what this fluid on her thoracentesis will be the transudate or exudate

## 2022-12-13 NOTE — PT/OT/SLP PROGRESS
Physical Therapy      Patient Name:  Olga Orellana   MRN:  [id number]    Patient not seen today secondary to Nurse/ RUPAL hold, declined medical status. Will follow-up next treatment date.

## 2022-12-13 NOTE — ASSESSMENT & PLAN NOTE
Poor PO intake with history of esophageal strictures and dysphagia. GI to evaluate, may need PEG placement vs EGD with dilation. NPO diet now due to aspiration risks.  Patient to be given 25mg of 25% albumin and IV lasix 40mg q6hrs for 4 doses. PICC team consulted, TPN panel pending.

## 2022-12-13 NOTE — PROGRESS NOTES
[facility] Medicine  Progress Note    Patient Name: Olga Orellana  MRN: [id number]  Patient Class: IP- Inpatient   Admission Date: 12/9/2022  Length of Stay: 4 days  Attending Physician: Hayden Rao Jr., MD  Primary Care Provider: Primary Doctor No        Subjective:     Principal Problem:Pneumonia involving left lung        HPI:  83yo F with PMH esophageal strictures with dilation, dysphagia, COPD, HTN presents as transfer from [facility].     Patient initially presented with shortness of breath and found to have left lower lobe pneumonia. She was started on IV antibiotics with rocephin and azithromycin as well as solumedrol for COPD exacerbation. Patient also with poor PO intake and weight loss. She has required three esophageal dilations in the past. Swallow evaluation showed esophageal dysphagia with recommendation of thin liquid diet as well as oral and esophageal candidiasis. Patient was transferred to [facility] for GI evaluation of dysphagia.       Overview/Hospital Course:  12/9: admitted for LLL PNA on IV Rocephin, azithromycin, COPD exacerbation on Solumedrol, Duonebs, budesonide, esophageal dysphagia pending GI consult.  12/10: continue IV ATX, breathing treatments, speech eval pending and potential barium UGI PRN after speech consult.  12/11: BP elevated. Losartan elissa doubled. Speech language eval.   12/12:  systolic O/N, improved after Hydralazine 10mg x1. Monitor BP. Patient agitated and combative in the AM, O2 sat dropped to 85%. Respiratory called, placed on nonrebreather, improved to 98%. ABG, repeat CXR Perihilar and lower lobe airspace opacities have slightly increased from comparison. Bilateral pleural effusions, similar.  12/13: Patient has significant bruising; D/C heparin and have TEDs and SCDS for DVT prophlaxis. LE US bilateral demonstrated no DVT. CTA demonstrated no PE, but bilateral pleural effusions. Patient to be  given 25mg of 25% albumin and IV lasix 40mg q6hrs for 4 doses. Pulmonology consulted for plugging of the bronchus intermedius and lower lobe bronchi (consistent with aspiration) on CTA; will determine if patient is candidate for bronchoscopy. US guided thoracentesis ordered. Patient escalated back to non-rebreather. PICC team consulted, TPN panel pending. GI will evaluate tomorrow for EGD vs PEG tube.       Interval History:  Patient less altered today on exam, states that she is cold. She is resting in bed comfortably. Patient is saturating in the 94% with 35% FiO2, 9L on facemask.     Patient has significant bruising; D/C heparin and have TEDs and SCDS for DVT prophlaxis. LE US bilateral demonstrated no DVT. CTA demonstrated no PE, but bilateral pleural effusions. Patient to be given 25mg of 25% albumin and IV lasix 40mg q6hrs for 4 doses. Pulmonology consulted for plugging of the bronchus intermedius and lower lobe bronchi (consistent with aspiration) on CTA; will determine if patient is candidate for bronchoscopy. US guided thoracentesis ordered. Patient escalated back to non-rebreather. PICC team consulted, TPN panel pending. GI consulted and will evaluate patient tomorrow for EGD vs PEG.    Review of Systems   Constitutional:  Negative for chills, fatigue and fever.   HENT:  Positive for trouble swallowing. Negative for sore throat.    Eyes:  Negative for visual disturbance.   Respiratory:  Negative for cough and shortness of breath.    Cardiovascular:  Negative for chest pain, palpitations and leg swelling.   Gastrointestinal:  Negative for abdominal pain, constipation, diarrhea, nausea, rectal pain and vomiting.   Genitourinary:  Negative for dysuria and hematuria.   Musculoskeletal:  Negative for arthralgias and myalgias.   Skin:  Negative for rash.   Neurological:  Negative for dizziness, light-headedness, numbness and headaches.   Objective:     Vital Signs (Most Recent):  Temp: 96.9 °F (36.1 °C)  (12/13/22 0748)  Pulse: (!) 54 (12/13/22 1223)  Resp: 16 (12/13/22 1223)  BP: (!) 179/65 (12/13/22 0931)  SpO2: 95 % (12/13/22 1223)   Vital Signs (24h Range):  Temp:  [96.9 °F (36.1 °C)-100.6 °F (38.1 °C)] 96.9 °F (36.1 °C)  Pulse:  [54-79] 54  Resp:  [16-20] 16  SpO2:  [93 %-100 %] 95 %  BP: (113-179)/(49-78) 179/65     Weight: 39.1 kg (86 lb 1.6 oz)  Body mass index is 16.27 kg/m².  No intake or output data in the 24 hours ending 12/13/22 1232   Physical Exam  Vitals reviewed.   Constitutional:       General: She is not in acute distress.     Comments: Chronically ill appearing, significantly malnourished   HENT:      Head: Normocephalic and atraumatic.      Right Ear: External ear normal.      Left Ear: External ear normal.      Nose: Nose normal.   Eyes:      General: No scleral icterus.     Extraocular Movements: Extraocular movements intact.      Pupils: Pupils are equal, round, and reactive to light.   Cardiovascular:      Rate and Rhythm: Normal rate and regular rhythm.      Heart sounds: Murmur heard.   Pulmonary:      Effort: Pulmonary effort is normal. No respiratory distress.      Breath sounds: Wheezing and rhonchi present.   Abdominal:      General: Abdomen is flat. Bowel sounds are normal. There is no distension.      Palpations: Abdomen is soft. There is no mass.      Tenderness: There is no abdominal tenderness.   Musculoskeletal:      Cervical back: Normal range of motion and neck supple.      Right lower leg: No edema.      Left lower leg: No edema.   Skin:     General: Skin is warm and dry.      Findings: Bruising present. No rash.   Neurological:      General: No focal deficit present.      Mental Status: She is alert and oriented to person, place, and time.   Psychiatric:         Mood and Affect: Mood normal.         Behavior: Behavior normal.       Significant Labs: All pertinent labs within the past 24 hours have been reviewed.    Significant Imaging: I have reviewed all pertinent imaging  results/findings within the past 24 hours.      Assessment/Plan:      * Pneumonia involving left lung  Presented to OSH with shortness of breath and hypoxia, found to have left lower lobe PNA on CXR. Procalcitonin elevated at 0.28, WBC 10 on admission; WBC today of 14.91. Blood cultures with no growth (12/13). Given patient's history of COPD/structural lung disease and concern for aspiration PNA:  Continue treatment with zosyn and stopped levaquin. Added Vancomycin on 12/12 and continue.   Ddimer elevated 2.43.  CTA chest (12/13) - no evidence to suggest pulmonary embolism. Pneumonia versus pulmonary edema. Large bilateral pleural effusions. Plugging of the bronchus intermedius and lower lobe bronchi, findings which can be seen in aspiration.  IV lasix 40mg q6hrs for 4 doses.  US LE bilateral (12/13) - No evidence to suggest deep venous thrombosis.   Placed on nonrebreather, O2 sat improved.   Well's score for PE 1.5 points (low risk; 1.3% chance of PE in an ED population)  Pulmonology consulted for plugging of the bronchus intermedius and lower lobe bronchi (consistent with aspiration) on CTA; will determine if patient is candidate for bronchoscopy. US guided thoracentesis ordered. Patient escalated back to non-rebreather.     Chronic anemia  Stable and continue to monitor  Patient has significant bruising; D/C heparin and have TEDs and SCDS for DVT prophlaxis.     Oral candidiasis  Candidiasis still noted but appears to be improved. Will continue fluconazole.      Severe malnutrition  Poor PO intake with history of esophageal strictures and dysphagia. GI to evaluate, may need PEG placement vs EGD with dilation. NPO diet now due to aspiration risks.  Patient to be given 25mg of 25% albumin and IV lasix 40mg q6hrs for 4 doses. PICC team consulted, TPN panel pending.     Hypertension  Hydralazine 10 PRN  Losartan dose doubled due to significant hypertension   Continue to monitor BP      Esophageal dysphagia  Has  history of esophageal strictures requiring dilation x3 in the past. Worsened dysphagia at present with poor PO intake. Swallow evaluation at OSH recommend thin liquid diet for now.   Swallow eval recommends full liquid diet and GI consult  GI barium UGI (12/12) - Patient unable to drink barium solution. No fluoroscopic exam performed.  Consulted GI to evaluate need for repeat esophageal dilation vs possible PEG placement, appreciate assistance.   GI will see patient tomorrow 12/14  PICC team consulted, TPN panel pending.     COPD exacerbation  Longstanding history of smoking, on no inhalers at home  Wheezing and rhonchi on exam. CXR (12/12) Perihilar and lower lobe airspace opacities have slightly increased from comparison. Bilateral pleural effusions. Probable pneumonia from CXR (12/9)  Started on steroids at OSH, currently solumedrol 40mg q12; will wean as tolerated  Levaquin for LLL PNA as above  Duonebs q4h, budesonide q12    VTE Risk Mitigation (From admission, onward)           Ordered     Place GENIA hose  Until discontinued         12/13/22 1128     IP VTE HIGH RISK PATIENT  Once         12/09/22 2123     Place sequential compression device  Until discontinued         12/09/22 2123                    Discharge Planning   GASTON:      Code Status: Full Code   Is the patient medically ready for discharge?:     Reason for patient still in hospital (select all that apply): Patient unstable, Patient trending condition, Laboratory test, Treatment, Consult recommendations and Pending disposition  Discharge Plan A: Home with family                  Jeremiah Ivy DO  Department of Hospital Medicine   [facility]

## 2022-12-13 NOTE — NURSING
Clear yellow urine obtained via catherization for UA, UA and cov-flu swab obtain and taken to lab as ordered.

## 2022-12-13 NOTE — HPI
Patient was initially admitted with shortness of breath found to have flow lower lobe pneumonia started on antibiotics as well as Solu-Medrol for COPD also had poor p.o. intake and weight loss.  Currently she is confused and can not give a lot of history she is on 60% non-rebreather with O2 sats in the 90s her workup shows [place] blood [place] 87571 hematocrit 31 and a platelet count of a 742937.  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  CT no emboli moderate pleural effusions bilaterally left greater than right no emboli maybe some plugging in the right middle and lower lobe no evidence of a DVT.  750 cc of clear yellow fluid obtained.  She was smoking when she was admitted to the hospital.

## 2022-12-13 NOTE — SUBJECTIVE & OBJECTIVE
Subjective:     Interval History: 12/13/22-Patients chart is reviewed. Note she was admitted originally on 12/9 with shortness of breath and findings of pneumonia.  She has a history of COPD as well.  Patient reportedly has had some ongoing weight loss and decreased oral intake as well.  Speech therapy did evaluate her with recommendations for thin liquid diet as well as findings of Candida.  According to her chart review she had reportedly has had prior esophageal dilations as well however there are no records in our facility database supporting this here.  Patient's family reports that she has had some ongoing dysphagia and decreased oral intake therefore she was evaluated by Dr. Sexton on 12/10 however no endoscopy was performed at that time.  GI has now been reconsulted to evaluate for possible EGD versus possible PEG placement.  Patient is seen resting in bed.  She will attempt to answer questions however uncertain as to her mental status at this time due to no family available.  It is noted on chart review that patient's daughter states she is not normally confused and was noted to be sitting more so this morning.  She does state that she just does not want to eat and she seems indicate it does not hurt when she does eat.  She does continue at this time on O2 via nonrebreather.  Noted on yesterday she did have a upper GI done however patient was unable to tolerate drinking the barium therefore no fluoroscopy exam was performed.    Review of Systems   Unable to perform ROS: Mental status change   Objective:     Vital Signs (Most Recent):  Temp: 98.6 °F (37 °C) (12/13/22 1200)  Pulse: (!) 54 (12/13/22 1223)  Resp: 16 (12/13/22 1223)  BP: (!) 157/58 (nurse aware) (12/13/22 1200)  SpO2: 95 % (12/13/22 1223)   Vital Signs (24h Range):  Temp:  [96.9 °F (36.1 °C)-100.6 °F (38.1 °C)] 98.6 °F (37 °C)  Pulse:  [54-79] 54  Resp:  [16-20] 16  SpO2:  [93 %-100 %] 95 %  BP: (113-179)/(49-78) 157/58     Weight: 39.1 kg (86  lb 1.6 oz) (12/13/22 0600)  Body mass index is 16.27 kg/m².    No intake or output data in the 24 hours ending 12/13/22 1422    Lines/Drains/Airways       Peripheral Intravenous Line  Duration                  Peripheral IV - Single Lumen 12/10/22 1810 20 G Anterior;Distal;Left;Lateral Forearm 2 days         Peripheral IV - Single Lumen 12/12/22 2230 Anterior;Left;Proximal Upper Arm <1 day                    Physical Exam  Vitals and nursing note reviewed. Exam conducted with a chaperone present.   Constitutional:       Appearance: Normal appearance. She is cachectic. She is ill-appearing.   HENT:      Head: Normocephalic.      Nose: Nose normal. No congestion or rhinorrhea.      Mouth/Throat:      Mouth: Mucous membranes are dry.      Pharynx: Oropharynx is clear. No oropharyngeal exudate or posterior oropharyngeal erythema.   Eyes:      General: No scleral icterus.     Pupils: Pupils are equal, round, and reactive to light.   Cardiovascular:      Rate and Rhythm: Normal rate and regular rhythm.      Pulses: Normal pulses.      Heart sounds: Normal heart sounds. No murmur heard.  Pulmonary:      Effort: Pulmonary effort is normal.      Breath sounds: No stridor. Rhonchi present. No wheezing or rales.   Abdominal:      General: Abdomen is flat. Bowel sounds are normal. There is no distension.      Palpations: Abdomen is soft. There is mass.      Tenderness: There is no guarding.   Musculoskeletal:      Cervical back: Normal range of motion.      Right lower leg: No edema.      Left lower leg: No edema.   Skin:     General: Skin is warm and dry.      Findings: Bruising present.   Neurological:      Mental Status: She is alert. She is disoriented.      Motor: Weakness present.   Psychiatric:      Comments: Unable to fully assess       Significant Labs:  All pertinent lab results from the last 24 hours have been reviewed.      Significant Imaging:  Imaging results within the past 24 hours have been reviewed.

## 2022-12-13 NOTE — PLAN OF CARE
12/31/20222 9482 Continue plan of care    0600 Lying in bed resting. No noted distress. Throughout this shift pt has been swallow for flu A/B and Covid urine sample collected and venous doppler done on bilateral lower extremities. Pt also went for CT of chest .Safety measures ongoing.

## 2022-12-13 NOTE — ASSESSMENT & PLAN NOTE
Stable and continue to monitor  Patient has significant bruising; D/C heparin and have TEDs and SCDS for DVT prophlaxis.

## 2022-12-13 NOTE — PROGRESS NOTES
[facility]  Gastroenterology  Progress Note    Patient Name: Olga Orellana  MRN: [id number]  Admission Date: 12/9/2022  Hospital Length of Stay: 4 days  Code Status: Full Code   Attending Provider: Hayden Rao Jr., MD  Consulting Provider: Fredis Fraire MD  Primary Care Physician: Primary Doctor No  Principal Problem: Pneumonia involving left lung    Patient seen and examined. Chart reviewed. Asked to see for PEG placement with inability to eat. She is requiring 60% NRB for support with recent pneumonia. Would like lung staus to be optimized before sedation with EGD/PEG.    Subjective:     Interval History: 12/13/22-Patients chart is reviewed. Note she was admitted originally on 12/9 with shortness of breath and findings of pneumonia.  She has a history of COPD as well.  Patient reportedly has had some ongoing weight loss and decreased oral intake as well.  Speech therapy did evaluate her with recommendations for thin liquid diet as well as findings of Candida.  According to her chart review she had reportedly has had prior esophageal dilations as well however there are no records in our facility database supporting this here.  Patient's family reports that she has had some ongoing dysphagia and decreased oral intake therefore she was evaluated by Dr. Sexton on 12/10 however no endoscopy was performed at that time.  GI has now been reconsulted to evaluate for possible EGD versus possible PEG placement.  Patient is seen resting in bed.  She will attempt to answer questions however uncertain as to her mental status at this time due to no family available.  It is noted on chart review that patient's daughter states she is not normally confused and was noted to be sitting more so this morning.  She does state that she just does not want to eat and she seems indicate it does not hurt when she does eat.  She does continue at this time on O2 via nonrebreather.  Noted on yesterday she did have  a upper GI done however patient was unable to tolerate drinking the barium therefore no fluoroscopy exam was performed.    Review of Systems   Unable to perform ROS: Mental status change   Objective:     Vital Signs (Most Recent):  Temp: 98.6 °F (37 °C) (12/13/22 1200)  Pulse: (!) 54 (12/13/22 1223)  Resp: 16 (12/13/22 1223)  BP: (!) 157/58 (nurse aware) (12/13/22 1200)  SpO2: 95 % (12/13/22 1223)   Vital Signs (24h Range):  Temp:  [96.9 °F (36.1 °C)-100.6 °F (38.1 °C)] 98.6 °F (37 °C)  Pulse:  [54-79] 54  Resp:  [16-20] 16  SpO2:  [93 %-100 %] 95 %  BP: (113-179)/(49-78) 157/58     Weight: 39.1 kg (86 lb 1.6 oz) (12/13/22 0600)  Body mass index is 16.27 kg/m².    No intake or output data in the 24 hours ending 12/13/22 1422    Lines/Drains/Airways       Peripheral Intravenous Line  Duration                  Peripheral IV - Single Lumen 12/10/22 1810 20 G Anterior;Distal;Left;Lateral Forearm 2 days         Peripheral IV - Single Lumen 12/12/22 2230 Anterior;Left;Proximal Upper Arm <1 day                    Physical Exam  Vitals and nursing note reviewed. Exam conducted with a chaperone present.   Constitutional:       Appearance: Normal appearance. She is cachectic. She is ill-appearing.   HENT:      Head: Normocephalic.      Nose: Nose normal. No congestion or rhinorrhea.      Mouth/Throat:      Mouth: Mucous membranes are dry.      Pharynx: Oropharynx is clear. No oropharyngeal exudate or posterior oropharyngeal erythema.   Eyes:      General: No scleral icterus.     Pupils: Pupils are equal, round, and reactive to light.   Cardiovascular:      Rate and Rhythm: Normal rate and regular rhythm.      Pulses: Normal pulses.      Heart sounds: Normal heart sounds. No murmur heard.  Pulmonary:      Effort: Pulmonary effort is normal.      Breath sounds: No stridor. Rhonchi present. No wheezing or rales.   Abdominal:      General: Abdomen is flat. Bowel sounds are normal. There is no distension.      Palpations: Abdomen  is soft. There is mass.      Tenderness: There is no guarding.   Musculoskeletal:      Cervical back: Normal range of motion.      Right lower leg: No edema.      Left lower leg: No edema.   Skin:     General: Skin is warm and dry.      Findings: Bruising present.   Neurological:      Mental Status: She is alert. She is disoriented.      Motor: Weakness present.   Psychiatric:      Comments: Unable to fully assess       Significant Labs:  All pertinent lab results from the last 24 hours have been reviewed.      Significant Imaging:  Imaging results within the past 24 hours have been reviewed.    Assessment/Plan:     Esophageal dysphagia  12/13/2022-chart is reviewed.  Labs reviewed.  Patient continues with poor oral intake.  Upper GI reviewed as well as speech therapy evaluation.  Family requesting possible PEG placement.  Consideration for endoscopy however with fairly new onset confusion as well as ongoing O2 requirements, patient will need to be stable from a neuro/respiratory standpoint prior to undergoing anesthesia/procedure.  We will however review findings further this time with Dr. Fraire, with plan an addendum to follow.        Thank you for your consult. I will follow-up with patient. Please contact us if you have any additional questions.    DOMINGO Akins  Gastroenterology  [facility]

## 2022-12-13 NOTE — PT/OT/SLP PROGRESS
Occupational Therapy      Patient Name:  Olga Orellana   MRN:  [id number]    Patient not seen today secondary to  (decline in medical status). Will follow-up on next treatment day.    12/13/2022

## 2022-12-14 PROBLEM — R63.8 UNABLE TO EAT: Status: ACTIVE | Noted: 2022-01-01

## 2022-12-14 PROBLEM — E55.9 VITAMIN D DEFICIENCY: Status: ACTIVE | Noted: 2022-01-01

## 2022-12-14 NOTE — ASSESSMENT & PLAN NOTE
Has history of esophageal strictures requiring dilation x3 in the past. Worsened dysphagia at present with poor PO intake. Swallow evaluation at OSH recommend thin liquid diet for now.   Swallow eval recommends full liquid diet and GI consult  GI barium UGI (12/12) - Patient unable to drink barium solution. No fluoroscopic exam performed.  Consulted GI to evaluate need for repeat esophageal dilation vs possible PEG placement, appreciate assistance.   GI holding PEG placement until respiratory status improves  PICC team consulted and PICC line placed today, TPN panel pending.

## 2022-12-14 NOTE — ASSESSMENT & PLAN NOTE
Stable and continue to monitor  Patient has significant bruising; holding heparin and have TEDs and SCDS for DVT prophlaxis.

## 2022-12-14 NOTE — SUBJECTIVE & OBJECTIVE
Interval History:  Patient without complaints    Objective:     Vital Signs (Most Recent):  Temp: 97.1 °F (36.2 °C) (12/14/22 0330)  Pulse: 63 (12/14/22 0330)  Resp: 20 (12/14/22 0330)  BP: (!) 178/55 (12/14/22 0330)  SpO2: 97 % (12/14/22 0740)   Vital Signs (24h Range):  Temp:  [96 °F (35.6 °C)-98.6 °F (37 °C)] 97.1 °F (36.2 °C)  Pulse:  [52-63] 63  Resp:  [16-20] 20  SpO2:  [92 %-98 %] 97 %  BP: (156-179)/(54-71) 178/55     Weight: 39.1 kg (86 lb 1.6 oz)  Body mass index is 16.27 kg/m².      Intake/Output Summary (Last 24 hours) at 12/14/2022 0901  Last data filed at 12/13/2022 1458  Gross per 24 hour   Intake --   Output 750 ml   Net -750 ml       Physical Exam  Vitals reviewed.   Constitutional:       Appearance: Normal appearance.      Interventions: She is not intubated.  HENT:      Head: Normocephalic and atraumatic.      Nose: Nose normal.      Mouth/Throat:      Mouth: Mucous membranes are dry.      Pharynx: Oropharynx is clear.   Eyes:      Extraocular Movements: Extraocular movements intact.      Conjunctiva/sclera: Conjunctivae normal.      Pupils: Pupils are equal, round, and reactive to light.   Cardiovascular:      Rate and Rhythm: Normal rate.      Heart sounds: Normal heart sounds. No murmur heard.  Pulmonary:      Effort: Pulmonary effort is normal. She is not intubated.      Breath sounds: Normal breath sounds.   Abdominal:      General: Abdomen is flat. Bowel sounds are normal.      Palpations: Abdomen is soft.   Musculoskeletal:         General: Normal range of motion.      Cervical back: Normal range of motion and neck supple.      Right lower leg: No edema.      Left lower leg: No edema.   Skin:     General: Skin is warm and dry.      Capillary Refill: Capillary refill takes less than 2 seconds.   Neurological:      General: No focal deficit present.      Mental Status: She is alert and oriented to person, place, and time.   Psychiatric:         Mood and Affect: Mood normal.         Behavior:  Behavior normal.       Vents:  Oxygen Concentration (%): 60 (12/14/22 0740)    Lines/Drains/Airways       Peripheral Intravenous Line  Duration                  External Jugular IV 12/14/22 0640 <1 day                    Significant Labs:    CBC/Anemia Profile:  Recent Labs   Lab 12/13/22  1339   WBC 12.93*   HGB 8.3*   HCT 26.5*   *   MCV 96.4*   RDW 17.1*        Chemistries:  Recent Labs   Lab 12/14/22  0312   *   K 2.4*      CO2 22   BUN 36*   CREATININE 1.08*   CALCIUM 7.6*   ALBUMIN 2.9*   PROT 5.2*   BILITOT 0.7   ALKPHOS 54*   ALT 15   AST 27       Recent Lab Results         12/14/22  0312   12/13/22  1456   12/13/22  1339   12/13/22  1305        Lymphs, Fluid   4           Albumin/Globulin Ratio 1.3             Albumin 2.9             Alkaline Phosphatase 54             ALT 15             ANION GAP 7             Aniso     1+         aPTT       37.1       AST 27             Bands     9         Baso #     0.04         Basophil %     0.3         BILIRUBIN TOTAL 0.7             Body Fluid, Albumin   0.7  Comment: Reference ranges for this analyte have not been established for the body fluid specimen submitted for analysis.           Body Fluid, Protein   1.3  Comment: Reference ranges for this analyte have not been established for the body fluid specimen submitted for analysis.           BUN 36             BUN/CREAT RATIO 33             Calcium 7.6             Cell Count Excluding RBCs   184           Chloride 107             Clarity, Body Fluid   Clear           CO2 22             Color, Body Fluid   Light Yellow           Creatinine 1.08             Crenated RBC's     Few         Culture, Body Fluid   No Growth To Date  [P]           Differential Type     Manual         eGFR 52             Eos #     0.00         Eosinophil %     0.0         Globulin, Total 2.3             Glucose 96             Glucose, Fluid   124  Comment: Reference ranges for this analyte have not been established for the  body fluid specimen submitted for analysis.           Gram Stain Result   No organisms seen  [P]           HEMATOCRIT     26.5         HEMOGLOBIN     8.3         Immature Grans (Abs)     0.11         Immature Granulocytes     0.9         INR       1.37       LD, Fluid   75  Comment: Reference ranges for this analyte have not been established for the body fluid specimen submitted for analysis.           Lymph #     0.61         Lymph %     4.7              3         Macrophages, Fluid Man %   12           MCH     30.2         MCHC     31.3         MCV     96.4         Mono #     0.25         Mono %     1.9              3         Monocytes, Fluid Man %   14           MPV     10.2         Neutrophils, Abs     11.92         Neutrophils Relative     92.2         nRBC     0.0         NUCLEATED RBC ABSOLUTE     0.00         Ph, Body Fluid   8.0           PLATELET MORPHOLOGY     Decreased         Platelets     143         Polys, Fluid Man %   70           Potassium 2.4             PROTEIN TOTAL 5.2             Protime       16.3       RBC     2.75         RBC, Body Fluid   <3,000           RDW     17.1         Segmented Neutrophils, Man %     85         Sodium 134             WBC     12.93                  [P] - Preliminary Result               Significant Imaging:  I have reviewed all pertinent imaging results/findings within the past 24 hours.

## 2022-12-14 NOTE — ASSESSMENT & PLAN NOTE
12/14/2022-postthoracentesis on yesterday and PICC line placement today.  Continues with poor intake.  Continues on nonrebreather as noted.  X-ray noted to show worsening pneumonia.  We will hold off on PEG placement at this time until improvements from respiratory standpoint.  Further plan and him to follow Dr. Fraire.      12/13/2022-chart is reviewed.  Labs reviewed.  Patient continues with poor oral intake.  Upper GI reviewed as well as speech therapy evaluation.  Family requesting possible PEG placement.  Consideration for endoscopy however with fairly new onset confusion as well as ongoing O2 requirements, patient will need to be stable from a neuro/respiratory standpoint prior to undergoing anesthesia/procedure.  We will however review findings further this time with Dr. Fraire, with plan an addendum to follow.

## 2022-12-14 NOTE — SUBJECTIVE & OBJECTIVE
Interval History:  Thoracentesis removed 750cc fluid yesterday. Patient's mentation improved on exam, not agitated and more orientated. She is resting in bed comfortably during rounds. Patient is saturating in the 97% with nonrebreather     K 2.4 after Lasix 40 IV O/N. Albumin improved to 2.9 after receiving Albumin. Pulmonary Dr. Benson holding bronchoscopy because of risk of respiratory failure. Echo and serum LDH pending for heart function and checking if lung fluid is transdative or exudative. Wean O2 as tolerated. IR consulted and PICC line placed for TPN feeding. GI holding PEG placement until respiratory status improves.    Review of Systems   Constitutional:  Negative for chills, fatigue and fever.   HENT:  Positive for trouble swallowing. Negative for sore throat.    Eyes:  Negative for visual disturbance.   Respiratory:  Negative for cough and shortness of breath.    Cardiovascular:  Negative for chest pain, palpitations and leg swelling.   Gastrointestinal:  Negative for abdominal pain, constipation, diarrhea, nausea, rectal pain and vomiting.   Genitourinary:  Negative for dysuria and hematuria.   Musculoskeletal:  Negative for arthralgias and myalgias.   Skin:  Negative for rash.   Neurological:  Negative for dizziness, light-headedness, numbness and headaches.   Objective:     Vital Signs (Most Recent):  Temp: 97.6 °F (36.4 °C) (12/14/22 0800)  Pulse: 60 (12/14/22 0800)  Resp: 20 (12/14/22 0800)  BP: (!) 174/63 (12/14/22 0800)  SpO2: 97 % (12/14/22 0800)   Vital Signs (24h Range):  Temp:  [96 °F (35.6 °C)-97.6 °F (36.4 °C)] 97.6 °F (36.4 °C)  Pulse:  [52-63] 59  Resp:  [18-20] 20  SpO2:  [92 %-98 %] 97 %  BP: (156-178)/(54-71) 174/63     Weight: 39.1 kg (86 lb 1.6 oz)  Body mass index is 16.27 kg/m².    Intake/Output Summary (Last 24 hours) at 12/14/2022 3709  Last data filed at 12/13/2022 1458  Gross per 24 hour   Intake --   Output 750 ml   Net -750 ml      Physical Exam  Vitals and nursing note  reviewed.   Constitutional:       General: She is not in acute distress.     Comments: Chronically ill appearing, significantly malnourished   HENT:      Head: Normocephalic and atraumatic.      Right Ear: External ear normal.      Left Ear: External ear normal.      Nose: Nose normal.   Eyes:      General: No scleral icterus.     Extraocular Movements: Extraocular movements intact.      Pupils: Pupils are equal, round, and reactive to light.   Cardiovascular:      Rate and Rhythm: Normal rate and regular rhythm.      Heart sounds: Murmur heard.   Pulmonary:      Effort: Pulmonary effort is normal. No respiratory distress.      Breath sounds: Wheezing and rhonchi present.   Abdominal:      General: Abdomen is flat. Bowel sounds are normal. There is no distension.      Palpations: Abdomen is soft. There is no mass.      Tenderness: There is no abdominal tenderness.   Musculoskeletal:      Cervical back: Normal range of motion and neck supple.      Right lower leg: No edema.      Left lower leg: No edema.   Skin:     General: Skin is warm and dry.      Findings: Bruising present. No rash.   Neurological:      General: No focal deficit present.      Mental Status: She is alert and oriented to person, place, and time.   Psychiatric:         Mood and Affect: Mood normal.         Behavior: Behavior normal.       Significant Labs: All pertinent labs within the past 24 hours have been reviewed.    Significant Imaging: I have reviewed all pertinent imaging results/findings within the past 24 hours.

## 2022-12-14 NOTE — NURSING
Attempted to start an new INT x2 due the patient has potassium riders due  r/t low potassium., unsuccessful, Dr. López notified by patient nurse.

## 2022-12-14 NOTE — PROCEDURES
Procedure performed by Dex RODRIGUEZ under the supervision of Dr. Thomas . 5 [place] PICC line placed in brachial vein in left upper extremity. Informed consent obtained including risks and possible complications. Patient pepped with chloro prep and draped in a sterile fashion. 2 cc 1% lidocaine infused. Utilizing U/S guidance a 42 cm 5 [place] PICC line placed and position verified by fluoroscopy. PICC line flushed with heparinized saline.Statlock and bandage applied. Patient tolerated procedure well with no immediate complication.

## 2022-12-14 NOTE — ASSESSMENT & PLAN NOTE
Presented to OSH with shortness of breath and hypoxia, found to have left lower lobe PNA on CXR. Procalcitonin elevated at 0.28, WBC 10 on admission; WBC today of 14.91. Blood cultures with no growth (12/13). Given patient's history of COPD/structural lung disease and concern for aspiration PNA:  Continue treatment with zosyn. Added Vancomycin on 12/12 and continue.   Ddimer elevated 2.43.  CTA chest (12/13) - no evidence to suggest pulmonary embolism. Pneumonia versus pulmonary edema. Large bilateral pleural effusions. Plugging of the bronchus intermedius and lower lobe bronchi, findings which can be seen in aspiration.  US LE bilateral (12/13) - No evidence to suggest deep venous thrombosis.   Placed on nonrebreather, O2 sat improved.   Well's score for PE 1.5 points (low risk; 1.3% chance of PE in an ED population)  Pulmonology consulted for plugging of the bronchus intermedius and lower lobe bronchi (consistent with aspiration) on CTA; Not candidate for bronchoscopy because of respiratory failure risk. Echo and serum LDH ordered for heart function and to determine if lung fluid is transudative vs exudative. Echo on 12/14 demonstrated     The left ventricle is normal in size with moderate concentric hypertrophy and normal systolic function.   The estimated ejection fraction is 55%.   Left ventricular diastolic dysfunction.   Atrial fibrillation not observed.   Normal right ventricular size.   Mild-to-moderate aortic regurgitation.   Mild mitral regurgitation.   Mild tricuspid regurgitation.   Normal central venous pressure (3 mmHg).   The estimated PA systolic pressure is 32 mmHg.   Small pericardial effusion.   There are bilateral pleural effusions.   There is moderate-to-severe aortic valve stenosis.   Aortic valve area is 0.66 cm2; peak velocity is 2.6 m/s; mean gradient is 10 mmHg.    US guided thoracentesis removed 750cc fluid yesterday. Fluid cell count 184, pH 8. Patient stable on  non-rebreather, wean O2 as tolerated.

## 2022-12-14 NOTE — ASSESSMENT & PLAN NOTE
Patient was admitted to the hospital with difficulty swallowing had dysphagia currently being worked up , your left with issue of COPD which I think is severe she is current requiring a lot of oxygen she may have some retained secretions as well as bilateral effusions I think you have to maximize her COPD component I agree with antibiotics will will look to add something to thin secretions however a bronchoscopy in this situation would be increased risk for possible respiratory failure and I do not think it will be that beneficial.  I would like to know what her cardiac function is and what this fluid on her thoracentesis will be the transudate or exudate    Would wean oxygen as tolerated

## 2022-12-14 NOTE — ASSESSMENT & PLAN NOTE
Poor PO intake with history of esophageal strictures and dysphagia. GI to evaluate, may need PEG placement vs EGD with dilation. NPO diet now due to aspiration risks.  Patient given 25mg of 25% albumin and IV lasix 40mg q6hrs yesterday. K 2.4 after Lasix 40 IV O/N. Albumin improved to 2.9 after receiving Albumin. K 2.1 on BMP recheck. Phosphorus 4.3 (WNL). Replete K. GI holding PEG placement until respiratory status improves.    PICC team and IR consulted, PICC line placed, TPN panel pending.

## 2022-12-14 NOTE — ASSESSMENT & PLAN NOTE
Longstanding history of smoking, on no inhalers at home  Wheezing and rhonchi on exam. CXR (12/12) Perihilar and lower lobe airspace opacities have slightly increased from comparison. Bilateral pleural effusions. Probable pneumonia from CXR (12/9)  Started on steroids at OSH, currently solumedrol 40mg q12; will wean as tolerated  Vanc and Zosyn for LLL PNA as above (stopped Levaquin 12/13)  Duonebs q4h, budesonide q12

## 2022-12-14 NOTE — PLAN OF CARE
Problem: Fluid Imbalance (Pneumonia)  Goal: Fluid Balance  Outcome: Ongoing, Progressing     Problem: Respiratory Compromise (Pneumonia)  Goal: Effective Oxygenation and Ventilation  Outcome: Ongoing, Progressing     Problem: Skin Injury Risk Increased  Goal: Skin Health and Integrity  Outcome: Ongoing, Progressing     Problem: Infection  Goal: Absence of Infection Signs and Symptoms  Outcome: Ongoing, Progressing

## 2022-12-14 NOTE — PLAN OF CARE
Problem: Respiratory Compromise (Pneumonia)  Goal: Effective Oxygenation and Ventilation  Outcome: Ongoing, Not Progressing     Problem: Skin Injury Risk Increased  Goal: Skin Health and Integrity  Outcome: Ongoing, Not Progressing

## 2022-12-14 NOTE — PT/OT/SLP PROGRESS
Occupational Therapy   Treatment    Name: Olga Orellana  MRN: [id number]  Admitting Diagnosis:  Pneumonia involving left lung       Recommendations:     Discharge Recommendations: nursing facility, skilled, rehabilitation facility  Discharge Equipment Recommendations:   (to be determined)  Barriers to discharge:  None    Assessment:     Olga Orellana is a 82 y.o. female with a medical diagnosis of Pneumonia involving left lung.  She presents with no complaints.Tx focused on EOB sitting and UE exercises. Performance deficits affecting function are weakness, impaired endurance, impaired self care skills, impaired functional mobility, impaired balance, decreased safety awareness.     Rehab Prognosis:  Good; patient would benefit from acute skilled OT services to address these deficits and reach maximum level of function.       Plan:     Patient to be seen 5 x/week to address the above listed problems via self-care/home management, therapeutic activities, therapeutic exercises  Plan of Care Expires:    Plan of Care Reviewed with: patient    Subjective     Pain/Comfort:  Pain Rating 1: 0/10  Pain Rating Post-Intervention 1: 0/10    Objective:     Communicated with: ELOY Gibbons prior to session.  Patient found HOB elevated with oxygen, peripheral IV, telemetry upon OT entry to room.    General Precautions: Standard, fall, respiratory    Orthopedic Precautions:N/A  Braces: N/A  Respiratory Status:  partial Rebreather 60% concentration and 18 Flow     Occupational Performance:     Bed Mobility:    Patient completed Supine to Sit with minimum assistance  Patient completed Sit to Supine with minimum assistance     Functional Mobility/Transfers:  Patient completed Sit <> Stand Transfer with minimum assistance and of 2 persons  with  hand-held assist and gait belt   Sit/stand x 2 attempts with min a x 2 with hand held assistance and gait belt  Pt sat EOB x 15 min a with SBA/CGA with cueing to correct balance.    Activities of  Daily Living:  Grooming: Pt washed face with setup. Pt combed hair with min a and (S)  .      AMPAC 6 Click ADL:      Treatment & Education:  Pt performed UE strengthening exercises. 1 lb hand wt x 2 sets of 10 reps with cueing (B) shoulder flexion/extension,adduction/abduction and (B) elbow and wrist flexion/extension.    Patient left HOB elevated with all lines intact, call button in reach, and transporters present    GOALS:   Multidisciplinary Problems       Occupational Therapy Goals          Problem: Occupational Therapy    Goal Priority Disciplines Outcome Interventions   Occupational Therapy Goal     OT, PT/OT Ongoing, Progressing    Description: STG:  Pt will perform grooming with setup  Pt will bathe with setup  Pt will perform UE dressing with (I)  Pt will perform LE dressing with (I)/Mod I  Pt will sit EOB x 10 min (I)  Pt will transfer bed/chair/bsc with CGA  Pt will perform standing task x 2 min with CGA   Pt will tolerate 20 minutes of tx without fatigue      LT.Restore to max I with self care and mobility.                          Time Tracking:     OT Date of Treatment: 22  OT Start Time: 836  OT Stop Time: 910  OT Total Time (min): 34 min    Billable Minutes:Therapeutic Activity 15  Therapeutic Exercise 10    OT/NEY: OT          2022

## 2022-12-14 NOTE — PT/OT/SLP PROGRESS
Physical Therapy Treatment    Patient Name:  Olga Orellana   MRN:  [id number]    Recommendations:     Discharge Recommendations: nursing facility, skilled, rehabilitation facility  Discharge Equipment Recommendations: other (see comments) (to be determined)  Barriers to discharge:  ongoing medical treatment    Assessment:     Olga Orellana is a 82 y.o. female admitted with a medical diagnosis of Pneumonia involving left lung.  She presents with the following impairments/functional limitations: weakness, impaired functional mobility, decreased safety awareness, impaired endurance, impaired skin, impaired self care skills.    Pt with O2 stats 99% and dropping to upper 80s during sitting and remaining throughout.  Pt able to assist with activities much better than anticipated    Rehab Prognosis: Fair and Poor; patient would benefit from acute skilled PT services to address these deficits and reach maximum level of function.    Recent Surgery: * No surgery found *      Plan:     During this hospitalization, patient to be seen 5 x/week to address the identified rehab impairments via gait training, therapeutic activities, therapeutic exercises and progress toward the following goals:    Plan of Care Expires:  12/10/22    Subjective     Chief Complaint: pneumonia  Patient/Family Comments/goals: pt alert this AM and agreeable  Pain/Comfort:         Objective:     Communicated with Natalee Gibbons RN prior to session.  Patient found HOB elevated with oxygen, telemetry, peripheral IV upon PT entry to room.     General Precautions: Standard, fall, respiratory  Orthopedic Precautions:    Braces:    Respiratory Status: Non-rebreather mask, flow 15 L/min     Functional Mobility:  Bed Mobility:     Supine to Sit: minimum assistance  Sit to Supine: minimum assistance  Transfers:     Sit to Stand:  minimum assistance and of 2 persons with rolling walker      AM-PAC 6 CLICK MOBILITY  Turning over in bed (including adjusting bedclothes,  sheets and blankets)?: 3  Sitting down on and standing up from a chair with arms (e.g., wheelchair, bedside commode, etc.): 3  Moving from lying on back to sitting on the side of the bed?: 3  Moving to and from a bed to a chair (including a wheelchair)?: 1  Need to walk in hospital room?: 1  Climbing 3-5 steps with a railing?: 1  Basic Mobility Total Score: 12       Treatment & Education:  Pt performed 20 reps (B) LE exercises: ap, quad set, glut set, straight leg raise, hip ab/adduction, heel slide with active assist range of motion     Standby assist to contact guard sitting EOB x 15 minutes    Minimum assist x 2 sit to stand x 2 trials with HHA x 2     Patient left HOB elevated with all lines intact, call button in reach, and transporters in room to transport pt for procedure ..    GOALS:   Multidisciplinary Problems       Physical Therapy Goals          Problem: Physical Therapy    Goal Priority Disciplines Outcome Goal Variances Interventions   Physical Therapy Goal     PT, PT/OT Ongoing, Progressing     Description: Short Term Goals  Ambulate Min - 100 feet with rolling walker assistive device.   Supine to sit minimum  Sit to stand minimum  SPT min  5. Independent with HEP    Long Term Goals  Ambulate SBA - 200 feet with rolling walker assistive device.   Supine to sit contact guard  Sit to stand contact guard  SPT contact guard  Needed equipment for home.                              Time Tracking:     PT Received On: 12/14/22  PT Start Time: 0834     PT Stop Time: 0912  PT Total Time (min): 38 min     Billable Minutes: Therapeutic Activity 15 and Therapeutic Exercise 10    Treatment Type: Treatment  PT/PTA: PTA     PTA Visit Number: 1     12/14/2022

## 2022-12-14 NOTE — PROGRESS NOTES
[facility]  Gastroenterology  Progress Note    Patient Name: Olga rOellana  MRN: [id number]  Admission Date: 12/9/2022  Hospital Length of Stay: 5 days  Code Status: Prior   Attending Provider: Hayden Rao Jr., MD  Consulting Provider: Fredis Fraire MD  Primary Care Physician: Primary Doctor No  Principal Problem: Pneumonia involving left lung     Pt seen and examined. Agree with findings below. She is still on hi flow O2 facemask after thoracentesis. CXR with increased RLL infiltrate. Her lung status needs to be optimized for sedation to do PEG. Consider NG feeding as interval option for nutrition. I will sign off for now. Reconsult when lung issues improved.Thanks    12/14/2022-patient is seen, resting in bed.  Patient is arousable but does fall back asleep.  Chart is reviewed and note she is for PICC line placement this morning due to poor peripheral access.  Her vital signs reviewed with oxygen saturations around 97% on 60% O2 by facemask.  She continues on IV antibiotics.  Note she did have a thoracentesis done on yesterday with reported 0.75 L of fluid removed.  Her repeat x-ray postthoracentesis shows an increasing RLL density possibly indicating worsening pneumonia.  Abdomen soft, nontender.    Subjective:     Interval History: 12/13/22-Patients chart is reviewed. Note she was admitted originally on 12/9 with shortness of breath and findings of pneumonia.  She has a history of COPD as well.  Patient reportedly has had some ongoing weight loss and decreased oral intake as well.  Speech therapy did evaluate her with recommendations for thin liquid diet as well as findings of Candida.  According to her chart review she had reportedly has had prior esophageal dilations as well however there are no records in our facility database supporting this here.  Patient's family reports that she has had some ongoing dysphagia and decreased oral intake therefore she was evaluated by   Darshan on 12/10 however no endoscopy was performed at that time.  GI has now been reconsulted to evaluate for possible EGD versus possible PEG placement.  Patient is seen resting in bed.  She will attempt to answer questions however uncertain as to her mental status at this time due to no family available.  It is noted on chart review that patient's daughter states she is not normally confused and was noted to be sitting more so this morning.  She does state that she just does not want to eat and she seems indicate it does not hurt when she does eat.  She does continue at this time on O2 via nonrebreather.  Noted on yesterday she did have a upper GI done however patient was unable to tolerate drinking the barium therefore no fluoroscopy exam was performed.    Review of Systems   Unable to perform ROS: Mental status change   Objective:     Vital Signs (Most Recent):  Temp: 97.6 °F (36.4 °C) (12/14/22 0800)  Pulse: 60 (12/14/22 0800)  Resp: 20 (12/14/22 0800)  BP: (!) 174/63 (12/14/22 0800)  SpO2: 97 % (12/14/22 0800)   Vital Signs (24h Range):  Temp:  [96 °F (35.6 °C)-98.6 °F (37 °C)] 97.6 °F (36.4 °C)  Pulse:  [52-63] 59  Resp:  [16-20] 20  SpO2:  [92 %-98 %] 97 %  BP: (156-178)/(54-71) 174/63     Weight: 39.1 kg (86 lb 1.6 oz) (12/13/22 0600)  Body mass index is 16.27 kg/m².      Intake/Output Summary (Last 24 hours) at 12/14/2022 1139  Last data filed at 12/13/2022 1458  Gross per 24 hour   Intake --   Output 750 ml   Net -750 ml       Lines/Drains/Airways       Peripherally Inserted Central Catheter Line  Duration             PICC Double Lumen 12/14/22 0954 left brachial <1 day              Peripheral Intravenous Line  Duration                  External Jugular IV 12/14/22 0640 <1 day                    Physical Exam  Vitals and nursing note reviewed. Exam conducted with a chaperone present.   Constitutional:       Appearance: Normal appearance. She is cachectic. She is ill-appearing.   HENT:      Head:  Normocephalic.      Nose: Nose normal. No congestion or rhinorrhea.      Mouth/Throat:      Mouth: Mucous membranes are dry.      Pharynx: Oropharynx is clear. No oropharyngeal exudate or posterior oropharyngeal erythema.   Eyes:      General: No scleral icterus.     Pupils: Pupils are equal, round, and reactive to light.   Cardiovascular:      Rate and Rhythm: Normal rate and regular rhythm.      Pulses: Normal pulses.      Heart sounds: Normal heart sounds. No murmur heard.  Pulmonary:      Effort: Pulmonary effort is normal.      Breath sounds: No stridor. Rhonchi present. No wheezing or rales.   Abdominal:      General: Abdomen is flat. Bowel sounds are normal. There is no distension.      Palpations: Abdomen is soft. There is mass.      Tenderness: There is no guarding.   Musculoskeletal:      Cervical back: Normal range of motion.      Right lower leg: No edema.      Left lower leg: No edema.   Skin:     General: Skin is warm and dry.      Findings: Bruising present.   Neurological:      Mental Status: She is alert. She is disoriented.      Motor: Weakness present.   Psychiatric:      Comments: Unable to fully assess       Significant Labs:  All pertinent lab results from the last 24 hours have been reviewed.      Significant Imaging:  Imaging results within the past 24 hours have been reviewed.    Assessment/Plan:     Esophageal dysphagia  12/14/2022-postthoracentesis on yesterday and PICC line placement today.  Continues with poor intake.  Continues on nonrebreather as noted.  X-ray noted to show worsening pneumonia.  We will hold off on PEG placement at this time until improvements from respiratory standpoint.  Further plan and him to follow Dr. Fraire.      12/13/2022-chart is reviewed.  Labs reviewed.  Patient continues with poor oral intake.  Upper GI reviewed as well as speech therapy evaluation.  Family requesting possible PEG placement.  Consideration for endoscopy however with fairly new onset  confusion as well as ongoing O2 requirements, patient will need to be stable from a neuro/respiratory standpoint prior to undergoing anesthesia/procedure.  We will however review findings further this time with Dr. Fraire, with plan an addendum to follow.        Thank you for your consult. I will follow-up with patient. Please contact us if you have any additional questions.    DOMINGO Akins  Gastroenterology  [facility]

## 2022-12-14 NOTE — PROGRESS NOTES
[facility] Medicine  Progress Note    Patient Name: Olga Orellana  MRN: [id number]  Patient Class: IP- Inpatient   Admission Date: 12/9/2022  Length of Stay: 5 days  Attending Physician: Hayden Rao Jr., MD  Primary Care Provider: Primary Doctor No        Subjective:     Principal Problem:Pneumonia involving left lung        HPI:  83yo F with PMH esophageal strictures with dilation, dysphagia, COPD, HTN presents as transfer from [facility].     Patient initially presented with shortness of breath and found to have left lower lobe pneumonia. She was started on IV antibiotics with rocephin and azithromycin as well as solumedrol for COPD exacerbation. Patient also with poor PO intake and weight loss. She has required three esophageal dilations in the past. Swallow evaluation showed esophageal dysphagia with recommendation of thin liquid diet as well as oral and esophageal candidiasis. Patient was transferred to [facility] for GI evaluation of dysphagia.       Overview/Hospital Course:  12/9: admitted for LLL PNA on IV Rocephin, azithromycin, COPD exacerbation on Solumedrol, Duonebs, budesonide, esophageal dysphagia pending GI consult.  12/10: continue IV ATX, breathing treatments, speech eval pending and potential barium UGI PRN after speech consult.  12/11: BP elevated. Losartan elissa doubled. Speech language eval.   12/12:  systolic O/N, improved after Hydralazine 10mg x1. Monitor BP. Patient agitated and combative in the AM, O2 sat dropped to 85%. Respiratory called, placed on nonrebreather, improved to 98%. ABG, repeat CXR Perihilar and lower lobe airspace opacities have slightly increased from comparison. Bilateral pleural effusions, similar.  12/13: Patient has significant bruising; D/C heparin and have TEDs and SCDS for DVT prophlaxis. LE US bilateral demonstrated no DVT. CTA demonstrated no PE, but bilateral pleural effusions. Patient to be  given 25mg of 25% albumin and IV lasix 40mg q6hrs for 4 doses. Pulmonology consulted for plugging of the bronchus intermedius and lower lobe bronchi (consistent with aspiration) on CTA; will determine if patient is candidate for bronchoscopy. US guided thoracentesis ordered. Patient escalated back to non-rebreather. PICC team consulted, TPN panel pending. GI will evaluate tomorrow for EGD vs PEG tube.   ** Thoracentesis completed on RT pleural space and 750 cc pulled off and sent for fluid studies   12/14: Patient's mentation improved. K 2.4 after Lasix 40 IV and 2.1 on recheck. Albumin improved to 2.9 after receiving Albumin. Pulmonary Dr. Benson holding bronchoscopy because of risk of respiratory failure. Echo and serum LDH pending for heart function and checking if lung fluid is transdative or exudative. Wean O2 as tolerated. PICC line placed for feeding. GI holding PEG placement until respiratory status improves.      Interval History:  Thoracentesis removed 750cc fluid yesterday. Patient's mentation improved on exam, not agitated and more orientated. She is resting in bed comfortably during rounds. Patient is saturating in the 97% with nonrebreather     K 2.4 after Lasix 40 IV O/N. Albumin improved to 2.9 after receiving Albumin. Pulmonary Dr. Benson holding bronchoscopy because of risk of respiratory failure. Echo and serum LDH pending for heart function and checking if lung fluid is transdative or exudative. Wean O2 as tolerated. IR consulted and PICC line placed for TPN feeding. GI holding PEG placement until respiratory status improves.    Review of Systems   Constitutional:  Negative for chills, fatigue and fever.   HENT:  Positive for trouble swallowing. Negative for sore throat.    Eyes:  Negative for visual disturbance.   Respiratory:  Negative for cough and shortness of breath.    Cardiovascular:  Negative for chest pain, palpitations and leg swelling.   Gastrointestinal:  Negative for abdominal pain,  constipation, diarrhea, nausea, rectal pain and vomiting.   Genitourinary:  Negative for dysuria and hematuria.   Musculoskeletal:  Negative for arthralgias and myalgias.   Skin:  Negative for rash.   Neurological:  Negative for dizziness, light-headedness, numbness and headaches.   Objective:     Vital Signs (Most Recent):  Temp: 97.6 °F (36.4 °C) (12/14/22 0800)  Pulse: 60 (12/14/22 0800)  Resp: 20 (12/14/22 0800)  BP: (!) 174/63 (12/14/22 0800)  SpO2: 97 % (12/14/22 0800)   Vital Signs (24h Range):  Temp:  [96 °F (35.6 °C)-97.6 °F (36.4 °C)] 97.6 °F (36.4 °C)  Pulse:  [52-63] 59  Resp:  [18-20] 20  SpO2:  [92 %-98 %] 97 %  BP: (156-178)/(54-71) 174/63     Weight: 39.1 kg (86 lb 1.6 oz)  Body mass index is 16.27 kg/m².    Intake/Output Summary (Last 24 hours) at 12/14/2022 1354  Last data filed at 12/13/2022 1458  Gross per 24 hour   Intake --   Output 750 ml   Net -750 ml      Physical Exam  Vitals and nursing note reviewed.   Constitutional:       General: She is not in acute distress.     Comments: Chronically ill appearing, significantly malnourished   HENT:      Head: Normocephalic and atraumatic.      Right Ear: External ear normal.      Left Ear: External ear normal.      Nose: Nose normal.   Eyes:      General: No scleral icterus.     Extraocular Movements: Extraocular movements intact.      Pupils: Pupils are equal, round, and reactive to light.   Cardiovascular:      Rate and Rhythm: Normal rate and regular rhythm.      Heart sounds: Murmur heard.   Pulmonary:      Effort: Pulmonary effort is normal. No respiratory distress.      Breath sounds: Wheezing and rhonchi present.   Abdominal:      General: Abdomen is flat. Bowel sounds are normal. There is no distension.      Palpations: Abdomen is soft. There is no mass.      Tenderness: There is no abdominal tenderness.   Musculoskeletal:      Cervical back: Normal range of motion and neck supple.      Right lower leg: No edema.      Left lower leg: No  edema.   Skin:     General: Skin is warm and dry.      Findings: Bruising present. No rash.   Neurological:      General: No focal deficit present.      Mental Status: She is alert and oriented to person, place, and time.   Psychiatric:         Mood and Affect: Mood normal.         Behavior: Behavior normal.       Significant Labs: All pertinent labs within the past 24 hours have been reviewed.    Significant Imaging: I have reviewed all pertinent imaging results/findings within the past 24 hours.      Assessment/Plan:      * Pneumonia involving left lung  Presented to OSH with shortness of breath and hypoxia, found to have left lower lobe PNA on CXR. Procalcitonin elevated at 0.28, WBC 10 on admission; WBC today of 14.91. Blood cultures with no growth (12/13). Given patient's history of COPD/structural lung disease and concern for aspiration PNA:  Continue treatment with zosyn. Added Vancomycin on 12/12 and continue.   Ddimer elevated 2.43.  CTA chest (12/13) - no evidence to suggest pulmonary embolism. Pneumonia versus pulmonary edema. Large bilateral pleural effusions. Plugging of the bronchus intermedius and lower lobe bronchi, findings which can be seen in aspiration.  US LE bilateral (12/13) - No evidence to suggest deep venous thrombosis.   Placed on nonrebreather, O2 sat improved.   Well's score for PE 1.5 points (low risk; 1.3% chance of PE in an ED population)  Pulmonology consulted for plugging of the bronchus intermedius and lower lobe bronchi (consistent with aspiration) on CTA; Not candidate for bronchoscopy because of respiratory failure risk. Echo and serum LDH ordered for heart function and to determine if lung fluid is transudative vs exudative. Echo on 12/14 demonstrated    The left ventricle is normal in size with moderate concentric hypertrophy and normal systolic function.  The estimated ejection fraction is 55%.  Left ventricular diastolic dysfunction.  Atrial fibrillation not  observed.  Normal right ventricular size.  Mild-to-moderate aortic regurgitation.  Mild mitral regurgitation.  Mild tricuspid regurgitation.  Normal central venous pressure (3 mmHg).  The estimated PA systolic pressure is 32 mmHg.  Small pericardial effusion.  There are bilateral pleural effusions.  There is moderate-to-severe aortic valve stenosis.  Aortic valve area is 0.66 cm2; peak velocity is 2.6 m/s; mean gradient is 10 mmHg.    US guided thoracentesis removed 750cc fluid yesterday. Light's criteria not met (pleural protein/serum protein <0.5, pleural LDH/serum LDH <0.6), suggests transudative effusion (pleural protein 1.3, serum protein 5.2, pleural LDH 75, serum ).    Fluid cell count 184, pH 8. Patient stable on non-rebreather, wean O2 as tolerated.     Chronic anemia  Stable and continue to monitor  Patient has significant bruising; holding heparin and have TEDs and SCDS for DVT prophlaxis.     Oral candidiasis  Candidiasis still noted but appears to be improved. Will continue fluconazole.      Severe malnutrition  Poor PO intake with history of esophageal strictures and dysphagia. GI to evaluate, may need PEG placement vs EGD with dilation. NPO diet now due to aspiration risks.  Patient given 25mg of 25% albumin and IV lasix 40mg q6hrs yesterday. K 2.4 after Lasix 40 IV O/N. Albumin improved to 2.9 after receiving Albumin. K 2.1 on BMP recheck. Phosphorus 4.3 (WNL). Replete K. GI holding PEG placement until respiratory status improves.    PICC team and IR consulted, PICC line placed, TPN panel pending.    Hypertension  Hydralazine 10 PRN  Losartan dose doubled due to significant hypertension   Continue to monitor BP      Esophageal dysphagia  Has history of esophageal strictures requiring dilation x3 in the past. Worsened dysphagia at present with poor PO intake. Swallow evaluation at OSH recommend thin liquid diet for now.   Swallow eval recommends full liquid diet and GI consult  GI barium UGI  (12/12) - Patient unable to drink barium solution. No fluoroscopic exam performed.  Consulted GI to evaluate need for repeat esophageal dilation vs possible PEG placement, appreciate assistance.   GI holding PEG placement until respiratory status improves  PICC team consulted and PICC line placed today, TPN panel pending.       COPD exacerbation  Longstanding history of smoking, on no inhalers at home  Wheezing and rhonchi on exam. CXR (12/12) Perihilar and lower lobe airspace opacities have slightly increased from comparison. Bilateral pleural effusions. Probable pneumonia from CXR (12/9)  Started on steroids at OSH, currently solumedrol 40mg q12; will wean as tolerated  Vanc and Zosyn for LLL PNA as above (stopped Levaquin 12/13)  Duonebs q4h, budesonide q12    VTE Risk Mitigation (From admission, onward)           Ordered     Place GENIA hose  Until discontinued         12/13/22 1128     IP VTE HIGH RISK PATIENT  Once         12/09/22 2123     Place sequential compression device  Until discontinued         12/09/22 2123                    Discharge Planning   GASTON:      Code Status: Prior   Is the patient medically ready for discharge?:     Reason for patient still in hospital (select all that apply): Patient unstable, Patient trending condition, Laboratory test, Treatment, Consult recommendations and Pending disposition  Discharge Plan A: Home with family                  Jeremiah Ivy DO  Department of Hospital Medicine   [facility]

## 2022-12-14 NOTE — PROGRESS NOTES
[facility]  Pulmonology  Progress Note    Patient Name: Olga Orellana  MRN: [id number]  Admission Date: 12/9/2022  Hospital Length of Stay: 5 days  Code Status: Full Code  Attending Provider: Hayden Rao Jr., MD  Primary Care Provider: Primary Doctor No   Principal Problem: Pneumonia involving left lung    Subjective:     Interval History:  Patient without complaints    Objective:     Vital Signs (Most Recent):  Temp: 97.1 °F (36.2 °C) (12/14/22 0330)  Pulse: 63 (12/14/22 0330)  Resp: 20 (12/14/22 0330)  BP: (!) 178/55 (12/14/22 0330)  SpO2: 97 % (12/14/22 0740)   Vital Signs (24h Range):  Temp:  [96 °F (35.6 °C)-98.6 °F (37 °C)] 97.1 °F (36.2 °C)  Pulse:  [52-63] 63  Resp:  [16-20] 20  SpO2:  [92 %-98 %] 97 %  BP: (156-179)/(54-71) 178/55     Weight: 39.1 kg (86 lb 1.6 oz)  Body mass index is 16.27 kg/m².      Intake/Output Summary (Last 24 hours) at 12/14/2022 0901  Last data filed at 12/13/2022 1458  Gross per 24 hour   Intake --   Output 750 ml   Net -750 ml       Physical Exam  Vitals reviewed.   Constitutional:       Appearance: Normal appearance.      Interventions: She is not intubated.  HENT:      Head: Normocephalic and atraumatic.      Nose: Nose normal.      Mouth/Throat:      Mouth: Mucous membranes are dry.      Pharynx: Oropharynx is clear.   Eyes:      Extraocular Movements: Extraocular movements intact.      Conjunctiva/sclera: Conjunctivae normal.      Pupils: Pupils are equal, round, and reactive to light.   Cardiovascular:      Rate and Rhythm: Normal rate.      Heart sounds: Normal heart sounds. No murmur heard.  Pulmonary:      Effort: Pulmonary effort is normal. She is not intubated.      Breath sounds: Normal breath sounds.   Abdominal:      General: Abdomen is flat. Bowel sounds are normal.      Palpations: Abdomen is soft.   Musculoskeletal:         General: Normal range of motion.      Cervical back: Normal range of motion and neck supple.      Right lower  leg: No edema.      Left lower leg: No edema.   Skin:     General: Skin is warm and dry.      Capillary Refill: Capillary refill takes less than 2 seconds.   Neurological:      General: No focal deficit present.      Mental Status: She is alert and oriented to person, place, and time.   Psychiatric:         Mood and Affect: Mood normal.         Behavior: Behavior normal.       Vents:  Oxygen Concentration (%): 60 (12/14/22 0740)    Lines/Drains/Airways       Peripheral Intravenous Line  Duration                  External Jugular IV 12/14/22 0640 <1 day                    Significant Labs:    CBC/Anemia Profile:  Recent Labs   Lab 12/13/22  1339   WBC 12.93*   HGB 8.3*   HCT 26.5*   *   MCV 96.4*   RDW 17.1*        Chemistries:  Recent Labs   Lab 12/14/22  0312   *   K 2.4*      CO2 22   BUN 36*   CREATININE 1.08*   CALCIUM 7.6*   ALBUMIN 2.9*   PROT 5.2*   BILITOT 0.7   ALKPHOS 54*   ALT 15   AST 27       Recent Lab Results         12/14/22  0312   12/13/22  1456   12/13/22  1339   12/13/22  1305        Lymphs, Fluid   4           Albumin/Globulin Ratio 1.3             Albumin 2.9             Alkaline Phosphatase 54             ALT 15             ANION GAP 7             Aniso     1+         aPTT       37.1       AST 27             Bands     9         Baso #     0.04         Basophil %     0.3         BILIRUBIN TOTAL 0.7             Body Fluid, Albumin   0.7  Comment: Reference ranges for this analyte have not been established for the body fluid specimen submitted for analysis.           Body Fluid, Protein   1.3  Comment: Reference ranges for this analyte have not been established for the body fluid specimen submitted for analysis.           BUN 36             BUN/CREAT RATIO 33             Calcium 7.6             Cell Count Excluding RBCs   184           Chloride 107             Clarity, Body Fluid   Clear           CO2 22             Color, Body Fluid   Light Yellow           Creatinine 1.08              Crenated RBC's     Few         Culture, Body Fluid   No Growth To Date  [P]           Differential Type     Manual         eGFR 52             Eos #     0.00         Eosinophil %     0.0         Globulin, Total 2.3             Glucose 96             Glucose, Fluid   124  Comment: Reference ranges for this analyte have not been established for the body fluid specimen submitted for analysis.           Gram Stain Result   No organisms seen  [P]           HEMATOCRIT     26.5         HEMOGLOBIN     8.3         Immature Grans (Abs)     0.11         Immature Granulocytes     0.9         INR       1.37       LD, Fluid   75  Comment: Reference ranges for this analyte have not been established for the body fluid specimen submitted for analysis.           Lymph #     0.61         Lymph %     4.7              3         Macrophages, Fluid Man %   12           MCH     30.2         MCHC     31.3         MCV     96.4         Mono #     0.25         Mono %     1.9              3         Monocytes, Fluid Man %   14           MPV     10.2         Neutrophils, Abs     11.92         Neutrophils Relative     92.2         nRBC     0.0         NUCLEATED RBC ABSOLUTE     0.00         Ph, Body Fluid   8.0           PLATELET MORPHOLOGY     Decreased         Platelets     143         Polys, Fluid Man %   70           Potassium 2.4             PROTEIN TOTAL 5.2             Protime       16.3       RBC     2.75         RBC, Body Fluid   <3,000           RDW     17.1         Segmented Neutrophils, Man %     85         Sodium 134             WBC     12.93                  [P] - Preliminary Result               Significant Imaging:  I have reviewed all pertinent imaging results/findings within the past 24 hours.    Assessment/Plan:     COPD exacerbation  Patient was admitted to the hospital with difficulty swallowing had dysphagia currently being worked up , your left with issue of COPD which I think is severe she is current requiring a lot  of oxygen she may have some retained secretions as well as bilateral effusions I think you have to maximize her COPD component I agree with antibiotics will will look to add something to thin secretions however a bronchoscopy in this situation would be increased risk for possible respiratory failure and I do not think it will be that beneficial.  I would like to know what her cardiac function is and what this fluid on her thoracentesis will be the transudate or exudate    Would wean oxygen as tolerated                 Yuriy Benson MD  Pulmonology  [facility]

## 2022-12-15 PROBLEM — E55.9 VITAMIN D DEFICIENCY: Status: RESOLVED | Noted: 2022-01-01 | Resolved: 2022-01-01

## 2022-12-15 PROBLEM — R13.19 ESOPHAGEAL DYSPHAGIA: Status: RESOLVED | Noted: 2022-01-01 | Resolved: 2022-01-01

## 2022-12-15 PROBLEM — D64.9 CHRONIC ANEMIA: Status: RESOLVED | Noted: 2022-01-01 | Resolved: 2022-01-01

## 2022-12-15 PROBLEM — J18.9 PNEUMONIA INVOLVING LEFT LUNG: Status: RESOLVED | Noted: 2022-01-01 | Resolved: 2022-01-01

## 2022-12-15 PROBLEM — R63.8 UNABLE TO EAT: Status: RESOLVED | Noted: 2022-01-01 | Resolved: 2022-01-01

## 2022-12-15 PROBLEM — B37.0 ORAL CANDIDIASIS: Status: RESOLVED | Noted: 2022-01-01 | Resolved: 2022-01-01

## 2022-12-15 PROBLEM — J44.1 COPD EXACERBATION: Status: RESOLVED | Noted: 2022-01-01 | Resolved: 2022-01-01

## 2022-12-15 PROBLEM — E43 SEVERE MALNUTRITION: Status: RESOLVED | Noted: 2022-01-01 | Resolved: 2022-01-01

## 2022-12-15 PROBLEM — I10 HYPERTENSION: Status: RESOLVED | Noted: 2022-01-01 | Resolved: 2022-01-01

## 2022-12-15 NOTE — DISCHARGE SUMMARY
[facility] Medicine  Discharge Summary      Patient Name: Olga Orellana  MRN: [id number]  ELISE: [id number]  Patient Class: IP- Inpatient  Admission Date: 12/9/2022  Hospital Length of Stay: 6 days  Discharge Date and Time: 12/15/2022     Attending Physician: Hayden Rao Jr., MD   Discharging Provider: Jocy Kumar MD  Primary Care Provider: Primary Doctor No    Primary Care Team: Networked reference to record PCT     HPI:   83yo F with PMH esophageal strictures with dilation, dysphagia, COPD, HTN presents as transfer from [facility].     Patient initially presented with shortness of breath and found to have left lower lobe pneumonia. She was started on IV antibiotics with rocephin and azithromycin as well as solumedrol for COPD exacerbation. Patient also with poor PO intake and weight loss. She has required three esophageal dilations in the past. Swallow evaluation showed esophageal dysphagia with recommendation of thin liquid diet as well as oral and esophageal candidiasis. Patient was transferred to [facility] for GI evaluation of dysphagia.       * No surgery found *      Hospital Course:   Patient admitted for aspiration pneumonia secondary to esophageal dysphagia and treated with IV antibiotics. COPD exacerbation treated with IV steroids and supplemental oxygen. Candidiasis treated with systemic fluconazole. GI was consulted for possible PEG placement vs esophageal dilation, however EGD not performed pending improvement in patient's respiratory status. Further workup remarkable for bilateral pleural effusions for which she underwent thoracentesis after evaluation by pulmonology. Unable to perform bronchoscopy due to patient's tenuous respiratory status.    Patient's respiratory status continued to decline and patient became obtunded with ABG significant for acute on chronic hypercapnic respiratory failure. Patient was placed on bipap and  transferred to the [facility] observation unit. Discussion with daughter regarding goals of care, decision was made to make patient DNR with no CPR, intubation, or vasopressors and wished for patient to be kept comfortable.      Overnight, called to see patient for asystole on telemetry. On exam, patient did not respond to verbal or physical stimuli. Absent heart sounds, no spontaneous respirations. Absent peripheral pulses. Pupils fixed and dilated. Time of death 0408. Family notified.         Goals of Care Treatment Preferences:  Code Status: DNR      Consults:   Consults (From admission, onward)        Status Ordering Provider     Inpatient consult to Gastroenterology  Once        Provider:  BA Fraire MD    Acknowledged BENJAMIN FLORES     Inpatient consult to Pulmonology  Once        Provider:  (Not yet assigned)    Acknowledged MAURICIO QUILES     Inpatient consult to PICC team ([facility])  Once        Provider:  (Not yet assigned)    Acknowledged MAURICIO QUILES     Pharmacy to dose Vancomycin consult  Once        Provider:  (Not yet assigned)   See Hyperspace for full Linked Orders Report.    Acknowledged FLORES GOEL     Inpatient consult to Gastroenterology  Once        Provider:  Amilcar Sexton MD    Completed SALMA TAMEZ          Vitamin D deficiency-resolved as of 12/15/2022  - Vitamin D level 12.5  - Will start Ergocalciferol 50,000units q7days  - Multivitamin       Final Active Diagnoses:      Problems Resolved During this Admission:    Diagnosis Date Noted Date Resolved POA    PRINCIPAL PROBLEM:  Pneumonia involving left lung [J18.9] 12/09/2022 12/15/2022 Yes    Esophageal dysphagia [R13.19] 12/09/2022 12/15/2022 Yes    COPD exacerbation [J44.1] 12/09/2022 12/15/2022 Yes    Severe malnutrition [E43] 12/09/2022 12/15/2022 Yes    Hypertension [I10] 12/09/2022 12/15/2022 Yes    Vitamin D deficiency [E55.9] 12/14/2022 12/15/2022 Yes    Unable to eat [R63.8] 12/14/2022 12/15/2022 Yes     Chronic anemia [D64.9] 12/10/2022 12/15/2022 Yes    Oral candidiasis [B37.0] 2022 12/15/2022 Yes       Discharged Condition:     Disposition:     Follow Up:    Patient Instructions:   No discharge procedures on file.    Significant Diagnostic Studies:     Pending Diagnostic Studies:     Procedure Component Value Units Date/Time    Basic Metabolic Panel [[id number]] Collected: 12/15/22 0333    Order Status: Sent Lab Status: In process Updated: 12/15/22 0438    Specimen: Blood     CBC Auto Differential [[id number]] Collected: 12/15/22 0333    Order Status: Sent Lab Status: In process Updated: 12/15/22 0448    Specimen: Blood     Narrative:      The following orders were created for panel order CBC Auto Differential.  Procedure                               Abnormality         Status                     ---------                               -----------         ------                     CBC with Differential[[id number]]                            In process                 Manual Differential[[id number]]                              In process                   Please view results for these tests on the individual orders.    EXTRA TUBES [[id number]] Collected: 22    Order Status: Sent Lab Status: In process Updated: 22    Specimen: Blood, Venous     Narrative:      The following orders were created for panel order EXTRA TUBES.  Procedure                               Abnormality         Status                     ---------                               -----------         ------                     Lavender Top Hold[[id number]]                                In process                   Please view results for these tests on the individual orders.    EXTRA TUBES [[id number]] Collected: 22    Order Status: Sent Lab Status: In process Updated: 22    Specimen: Blood, Venous     Narrative:      The following orders were created for panel order EXTRA TUBES.  Procedure                                Abnormality         Status                     ---------                               -----------         ------                     Lavender Top Hold[[id number]]                                In process                   Please view results for these tests on the individual orders.    Manual Differential [[id number]] Collected: 12/15/22 0333    Order Status: Sent Lab Status: In process Updated: 12/15/22 0448    Specimen: Blood          Medications:  None    Indwelling Lines/Drains at time of discharge:   Lines/Drains/Airways     Peripherally Inserted Central Catheter Line  Duration           PICC Double Lumen 12/14/22 0954 left brachial <1 day                Time spent on the discharge of patient: 35 minutes      Jocy Kumar MD  Department of Hospital Medicine  [facility]

## 2022-12-15 NOTE — PLAN OF CARE
[facility]  Discharge Final Note    Primary Care Provider: Primary Doctor No    Expected Discharge Date: 12/15/2022    Final Discharge Note (most recent)       Final Note - 12/15/22 0908          Final Note    Assessment Type Final Discharge Note     Anticipated Discharge Disposition                      Important Message from Medicare    Chart reviewed, Pt  12/15/22

## 2022-12-15 NOTE — SIGNIFICANT EVENT
Patient moved to [facility] for obtunded status and agonal respirations.  ABG 6.98/>115/80    Placed on Bipap just before ABG. Spoke with Ms Matos her daughter that has been present.  Without intubation and mechanical ventilation I would not expect to survive the night.  She states that her mom did not want to be on a ventilator.  She is ok with attempts at non invasive ventilation.  Nointubation, no CPR.  She understands that I don't expect noninvasive ventilation to be adequate.       Code status changed in computer to DNR.

## 2022-12-15 NOTE — NURSING
1748-Pt was noted with an O2 saturation  of 88 ,sat probe changed pt repositioned and no change noted partial rebreather now a nonrebreather at 15L , upon listening to lung sounds, pt was noted to be diminished breath sounds on the right side, a stat chest x-ray was ordered, will continue to monitor pt for further status changes.      1813-report was called to ELOY Oh    1816-Pt was transferred to [facility], all belongings packed and sent with pt    1823-Pt family Radha Lara (Daughter) was notified of the move to the [facility], pt update and phone number to new unit were given to family

## 2022-12-18 LAB
BACTERIA BLD CULT: NORMAL
BACTERIA BLD CULT: NORMAL